# Patient Record
Sex: MALE | Race: WHITE | NOT HISPANIC OR LATINO | Employment: FULL TIME | URBAN - METROPOLITAN AREA
[De-identification: names, ages, dates, MRNs, and addresses within clinical notes are randomized per-mention and may not be internally consistent; named-entity substitution may affect disease eponyms.]

---

## 2018-11-12 ENCOUNTER — OFFICE VISIT (OUTPATIENT)
Dept: INTERNAL MEDICINE | Facility: CLINIC | Age: 38
End: 2018-11-12
Payer: COMMERCIAL

## 2018-11-12 ENCOUNTER — LAB VISIT (OUTPATIENT)
Dept: LAB | Facility: OTHER | Age: 38
End: 2018-11-12
Payer: COMMERCIAL

## 2018-11-12 VITALS
BODY MASS INDEX: 29.92 KG/M2 | SYSTOLIC BLOOD PRESSURE: 124 MMHG | DIASTOLIC BLOOD PRESSURE: 90 MMHG | WEIGHT: 220.88 LBS | HEART RATE: 71 BPM | HEIGHT: 72 IN | OXYGEN SATURATION: 97 %

## 2018-11-12 DIAGNOSIS — H46.9 OPTIC NEURITIS, LEFT: ICD-10-CM

## 2018-11-12 DIAGNOSIS — E03.8 SUBCLINICAL HYPOTHYROIDISM: ICD-10-CM

## 2018-11-12 DIAGNOSIS — E03.9 ACQUIRED HYPOTHYROIDISM: Primary | ICD-10-CM

## 2018-11-12 LAB
T3FREE SERPL-MCNC: 2 PG/ML
T4 FREE SERPL-MCNC: 0.59 NG/DL
TSH SERPL DL<=0.005 MIU/L-ACNC: 88.49 UIU/ML

## 2018-11-12 PROCEDURE — 99203 OFFICE O/P NEW LOW 30 MIN: CPT | Mod: S$GLB,,, | Performed by: FAMILY MEDICINE

## 2018-11-12 PROCEDURE — 99999 PR PBB SHADOW E&M-NEW PATIENT-LVL III: CPT | Mod: PBBFAC,,, | Performed by: FAMILY MEDICINE

## 2018-11-12 PROCEDURE — 3008F BODY MASS INDEX DOCD: CPT | Mod: CPTII,S$GLB,, | Performed by: FAMILY MEDICINE

## 2018-11-12 PROCEDURE — 36415 COLL VENOUS BLD VENIPUNCTURE: CPT

## 2018-11-12 PROCEDURE — 84439 ASSAY OF FREE THYROXINE: CPT

## 2018-11-12 PROCEDURE — 84443 ASSAY THYROID STIM HORMONE: CPT

## 2018-11-12 PROCEDURE — 84481 FREE ASSAY (FT-3): CPT

## 2018-11-12 NOTE — PROGRESS NOTES
Subjective:       Patient ID: Mundo Price is a 37 y.o. male.    Chief Complaint: Establish care     HPI  This patient is new to me.   Mundo Price is a 37 y.o. year old male with recent Hx of vision loss and hypothyroid who presents today to establish care and discuss medical problems.     Beginning 10/29/18 - patient had loss of central vision in the left eye. Went to see optmarcelle (Dr. Cabrera) - exam revealed optic neuritis. Patient was then sent to [facility] ED for further work-up. MRI brain and orbits normal per radiologist.   Took oral prednisone 60mg for only three days. Dr. Cabrera apparently prescribed prednisone for 1 month, but patient discontinued early because he heard from his neurologist friend that prednisone can worsen optic neuritis.   Saw optho again and it was recommended he go see Dr. Camarillo (optho specialist). Thyroid labs ordered and patient found to have hypothyroid (TSH >100, T4 3.4, and T3 73.6).  To treat optic neuritis he was given 3 days of SoluMedrol 1000mg each day.   He finished this course and vision has still not resolved. Still has loss of vision in the center of his left eye, but it has improved.     Lipid panel 5/1/18  Tchol 257  HDL 57        Exercise - hasn't exercised lately due to stress with work   Diet - wife cooks healthy (fish, healthy carbs, veggies, fruits). Only drinks water.     I personally reviewed Past Medical History, Past Surgical History, Social History, and Family History    Review of Systems   Constitutional: Positive for fatigue. Negative for activity change, chills, fever and unexpected weight change.   HENT: Negative for congestion, hearing loss, rhinorrhea, sore throat and trouble swallowing.    Eyes: Positive for visual disturbance. Negative for discharge.   Respiratory: Negative for cough, chest tightness, shortness of breath and wheezing.    Cardiovascular: Negative for chest pain, palpitations and leg swelling.   Gastrointestinal:  Negative for abdominal pain, blood in stool, constipation, diarrhea, nausea and vomiting.   Endocrine: Negative for polydipsia and polyuria.   Genitourinary: Negative for difficulty urinating, dysuria, frequency, hematuria and urgency.   Musculoskeletal: Negative for arthralgias, joint swelling, myalgias and neck pain.   Skin: Negative for rash.   Neurological: Negative for dizziness, syncope, weakness and headaches.   Psychiatric/Behavioral: Negative for confusion, dysphoric mood and sleep disturbance. The patient is not nervous/anxious.        Objective:      Vitals:    11/12/18 1549   BP: (!) 124/90   Pulse: 71   SpO2: 97%   Weight: 100.2 kg (220 lb 14.4 oz)   Height: 6' (1.829 m)     Physical Exam   Constitutional: He is oriented to person, place, and time. He appears well-developed and well-nourished. No distress.   HENT:   Head: Normocephalic and atraumatic.   Right Ear: Hearing, tympanic membrane, external ear and ear canal normal.   Left Ear: Hearing, tympanic membrane, external ear and ear canal normal.   Nose: Nose normal.   Mouth/Throat: Oropharynx is clear and moist and mucous membranes are normal. Normal dentition. No oropharyngeal exudate.   Eyes: Conjunctivae and lids are normal. Pupils are equal, round, and reactive to light.   Neck: Normal range of motion. No thyroid mass and no thyromegaly present.   Cardiovascular: Normal rate, regular rhythm, S1 normal, S2 normal and intact distal pulses.   No murmur heard.  No lower extremity edema   Pulmonary/Chest: Effort normal and breath sounds normal. No respiratory distress.   Abdominal: Soft. Bowel sounds are normal. There is no tenderness.   Lymphadenopathy:     He has no cervical adenopathy.        Right: No supraclavicular adenopathy present.        Left: No supraclavicular adenopathy present.   Neurological: He is alert and oriented to person, place, and time. He is not disoriented.   Skin: Skin is warm and dry. No rash noted.   Psychiatric: He has a  normal mood and affect. His behavior is normal. Thought content normal.   Nursing note and vitals reviewed.      Assessment:       1. Acquired hypothyroidism    2. Optic neuritis, left        Plan:   Diagnoses and all orders for this visit:    Acquired hypothyroidism          - Will repeat lab work to confirm and treat accordingly.   -     TSH; Future  -     T4, free; Future  -     T3, free; Future    Optic neuritis, left        - Patient is s/p IV Solu-medrol therapy. Patient not interested in following up with optho or neurology as he says the vision will improve in a few weeks (per his neurology friend). I recommended that he establish with optho and neurology, but he refused today.     Patient refused vaccines today.

## 2018-11-13 ENCOUNTER — PATIENT MESSAGE (OUTPATIENT)
Dept: INTERNAL MEDICINE | Facility: CLINIC | Age: 38
End: 2018-11-13

## 2018-11-13 DIAGNOSIS — H46.9 OPTIC NEURITIS: Primary | ICD-10-CM

## 2018-11-13 RX ORDER — LEVOTHYROXINE SODIUM 125 UG/1
125 TABLET ORAL DAILY
Qty: 30 TABLET | Refills: 2 | Status: SHIPPED | OUTPATIENT
Start: 2018-11-13 | End: 2018-12-06 | Stop reason: SDUPTHER

## 2018-11-16 PROBLEM — H46.9 OPTIC NEURITIS: Status: ACTIVE | Noted: 2018-11-16

## 2018-11-16 PROBLEM — E03.9 ACQUIRED HYPOTHYROIDISM: Status: ACTIVE | Noted: 2018-11-16

## 2018-11-16 NOTE — TELEPHONE ENCOUNTER
Please process lab order. I sent patient a my chart message about going to have blood drawn.    thanks

## 2018-11-19 ENCOUNTER — LAB VISIT (OUTPATIENT)
Dept: LAB | Facility: OTHER | Age: 38
End: 2018-11-19
Payer: COMMERCIAL

## 2018-11-19 DIAGNOSIS — H46.9 OPTIC NEURITIS: ICD-10-CM

## 2018-11-19 PROCEDURE — 36415 COLL VENOUS BLD VENIPUNCTURE: CPT

## 2018-11-19 PROCEDURE — 86618 LYME DISEASE ANTIBODY: CPT

## 2018-11-21 ENCOUNTER — PATIENT MESSAGE (OUTPATIENT)
Dept: INTERNAL MEDICINE | Facility: CLINIC | Age: 38
End: 2018-11-21

## 2018-11-21 LAB — B BURGDOR AB SER IA-ACNC: 0.03 INDEX VALUE

## 2018-11-28 ENCOUNTER — OFFICE VISIT (OUTPATIENT)
Dept: INTERNAL MEDICINE | Facility: CLINIC | Age: 38
End: 2018-11-28
Attending: INTERNAL MEDICINE
Payer: COMMERCIAL

## 2018-11-28 ENCOUNTER — TELEPHONE (OUTPATIENT)
Dept: INTERNAL MEDICINE | Facility: CLINIC | Age: 38
End: 2018-11-28

## 2018-11-28 ENCOUNTER — HOSPITAL ENCOUNTER (EMERGENCY)
Facility: HOSPITAL | Age: 38
Discharge: HOME | End: 2018-11-28
Attending: EMERGENCY MEDICINE
Payer: COMMERCIAL

## 2018-11-28 VITALS
RESPIRATION RATE: 16 BRPM | OXYGEN SATURATION: 98 % | HEART RATE: 78 BPM | BODY MASS INDEX: 29.12 KG/M2 | WEIGHT: 215 LBS | SYSTOLIC BLOOD PRESSURE: 126 MMHG | DIASTOLIC BLOOD PRESSURE: 68 MMHG | HEIGHT: 72 IN | TEMPERATURE: 99 F

## 2018-11-28 VITALS
BODY MASS INDEX: 28.6 KG/M2 | HEART RATE: 72 BPM | DIASTOLIC BLOOD PRESSURE: 73 MMHG | WEIGHT: 215.81 LBS | HEIGHT: 73 IN | OXYGEN SATURATION: 98 % | SYSTOLIC BLOOD PRESSURE: 115 MMHG

## 2018-11-28 DIAGNOSIS — R20.0 FACIAL NUMBNESS: Primary | ICD-10-CM

## 2018-11-28 DIAGNOSIS — M62.838 MUSCLE SPASM: ICD-10-CM

## 2018-11-28 DIAGNOSIS — G37.9 DEMYELINATING CHANGES IN BRAIN: Primary | ICD-10-CM

## 2018-11-28 DIAGNOSIS — R20.2 PARESTHESIAS: ICD-10-CM

## 2018-11-28 DIAGNOSIS — H46.9 OPTIC NEURITIS: ICD-10-CM

## 2018-11-28 LAB
25(OH)D3+25(OH)D2 SERPL-MCNC: 26 NG/ML
ALBUMIN SERPL BCP-MCNC: 5 G/DL
ALP SERPL-CCNC: 47 U/L
ALT SERPL W/O P-5'-P-CCNC: 60 U/L
ANION GAP SERPL CALC-SCNC: 11 MMOL/L
AST SERPL-CCNC: 33 U/L
BACTERIA #/AREA URNS AUTO: NORMAL /HPF
BASOPHILS # BLD AUTO: 0.11 K/UL
BASOPHILS NFR BLD: 1.5 %
BILIRUB SERPL-MCNC: 1.5 MG/DL
BILIRUB UR QL STRIP: NEGATIVE
BUN SERPL-MCNC: 18 MG/DL
CALCIUM SERPL-MCNC: 10.3 MG/DL
CHLORIDE SERPL-SCNC: 107 MMOL/L
CLARITY UR REFRACT.AUTO: CLEAR
CO2 SERPL-SCNC: 23 MMOL/L
COLOR UR AUTO: YELLOW
CREAT SERPL-MCNC: 0.9 MG/DL
DIFFERENTIAL METHOD: ABNORMAL
EOSINOPHIL # BLD AUTO: 0.1 K/UL
EOSINOPHIL NFR BLD: 1.1 %
ERYTHROCYTE [DISTWIDTH] IN BLOOD BY AUTOMATED COUNT: 11.5 %
EST. GFR  (AFRICAN AMERICAN): >60 ML/MIN/1.73 M^2
EST. GFR  (NON AFRICAN AMERICAN): >60 ML/MIN/1.73 M^2
GLUCOSE SERPL-MCNC: 78 MG/DL
GLUCOSE UR QL STRIP: NEGATIVE
HCT VFR BLD AUTO: 49.8 %
HGB BLD-MCNC: 16.8 G/DL
HGB UR QL STRIP: NEGATIVE
IMM GRANULOCYTES # BLD AUTO: 0.15 K/UL
IMM GRANULOCYTES NFR BLD AUTO: 2.1 %
KETONES UR QL STRIP: NEGATIVE
LEUKOCYTE ESTERASE UR QL STRIP: ABNORMAL
LYMPHOCYTES # BLD AUTO: 2 K/UL
LYMPHOCYTES NFR BLD: 28.1 %
MAGNESIUM SERPL-MCNC: 2.8 MG/DL
MCH RBC QN AUTO: 32.6 PG
MCHC RBC AUTO-ENTMCNC: 33.7 G/DL
MCV RBC AUTO: 97 FL
MICROSCOPIC COMMENT: NORMAL
MONOCYTES # BLD AUTO: 0.7 K/UL
MONOCYTES NFR BLD: 9 %
NEUTROPHILS # BLD AUTO: 4.2 K/UL
NEUTROPHILS NFR BLD: 58.2 %
NITRITE UR QL STRIP: NEGATIVE
NRBC BLD-RTO: 0 /100 WBC
PH UR STRIP: 5 [PH] (ref 5–8)
PLATELET # BLD AUTO: 182 K/UL
PMV BLD AUTO: 10 FL
POTASSIUM SERPL-SCNC: 3.9 MMOL/L
PROT SERPL-MCNC: 8.7 G/DL
PROT UR QL STRIP: NEGATIVE
RBC # BLD AUTO: 5.16 M/UL
RBC #/AREA URNS AUTO: 2 /HPF (ref 0–4)
SODIUM SERPL-SCNC: 141 MMOL/L
SP GR UR STRIP: 1.01 (ref 1–1.03)
URN SPEC COLLECT METH UR: ABNORMAL
WBC # BLD AUTO: 7.26 K/UL
WBC #/AREA URNS AUTO: 5 /HPF (ref 0–5)

## 2018-11-28 PROCEDURE — 83735 ASSAY OF MAGNESIUM: CPT

## 2018-11-28 PROCEDURE — A9585 GADOBUTROL INJECTION: HCPCS | Performed by: EMERGENCY MEDICINE

## 2018-11-28 PROCEDURE — 25500020 PHARM REV CODE 255: Performed by: EMERGENCY MEDICINE

## 2018-11-28 PROCEDURE — 86147 CARDIOLIPIN ANTIBODY EA IG: CPT

## 2018-11-28 PROCEDURE — 99213 OFFICE O/P EST LOW 20 MIN: CPT | Mod: S$GLB,,, | Performed by: INTERNAL MEDICINE

## 2018-11-28 PROCEDURE — 86705 HEP B CORE ANTIBODY IGM: CPT

## 2018-11-28 PROCEDURE — 82164 ANGIOTENSIN I ENZYME TEST: CPT

## 2018-11-28 PROCEDURE — 86038 ANTINUCLEAR ANTIBODIES: CPT

## 2018-11-28 PROCEDURE — 86592 SYPHILIS TEST NON-TREP QUAL: CPT

## 2018-11-28 PROCEDURE — 86235 NUCLEAR ANTIGEN ANTIBODY: CPT

## 2018-11-28 PROCEDURE — 81001 URINALYSIS AUTO W/SCOPE: CPT

## 2018-11-28 PROCEDURE — 85025 COMPLETE CBC W/AUTO DIFF WBC: CPT

## 2018-11-28 PROCEDURE — 99999 PR PBB SHADOW E&M-EST. PATIENT-LVL III: CPT | Mod: PBBFAC,,, | Performed by: INTERNAL MEDICINE

## 2018-11-28 PROCEDURE — 80053 COMPREHEN METABOLIC PANEL: CPT

## 2018-11-28 PROCEDURE — 82306 VITAMIN D 25 HYDROXY: CPT

## 2018-11-28 PROCEDURE — 86703 HIV-1/HIV-2 1 RESULT ANTBDY: CPT

## 2018-11-28 PROCEDURE — 86787 VARICELLA-ZOSTER ANTIBODY: CPT

## 2018-11-28 PROCEDURE — 3008F BODY MASS INDEX DOCD: CPT | Mod: CPTII,S$GLB,, | Performed by: INTERNAL MEDICINE

## 2018-11-28 PROCEDURE — 86255 FLUORESCENT ANTIBODY SCREEN: CPT

## 2018-11-28 PROCEDURE — 86803 HEPATITIS C AB TEST: CPT

## 2018-11-28 PROCEDURE — 86704 HEP B CORE ANTIBODY TOTAL: CPT

## 2018-11-28 PROCEDURE — 86790 VIRUS ANTIBODY NOS: CPT

## 2018-11-28 PROCEDURE — 99285 EMERGENCY DEPT VISIT HI MDM: CPT | Mod: ,,, | Performed by: EMERGENCY MEDICINE

## 2018-11-28 PROCEDURE — 86235 NUCLEAR ANTIGEN ANTIBODY: CPT | Mod: 59

## 2018-11-28 PROCEDURE — 99284 EMERGENCY DEPT VISIT MOD MDM: CPT | Mod: 25

## 2018-11-28 RX ORDER — GADOBUTROL 604.72 MG/ML
10 INJECTION INTRAVENOUS
Status: COMPLETED | OUTPATIENT
Start: 2018-11-28 | End: 2018-11-28

## 2018-11-28 RX ADMIN — GADOBUTROL 10 ML: 604.72 INJECTION INTRAVENOUS at 03:11

## 2018-11-28 NOTE — ED NOTES
Pt is AA&O times four Resp full and reg Radial pulse strong and reg Wife at bedside PT awaiting MRI results

## 2018-11-28 NOTE — TELEPHONE ENCOUNTER
----- Message from Heidy Panda sent at 11/28/2018  8:05 AM CST -----  Contact: pt            Name of Who is Calling: Mundo      What is the request in detail: requesting a call back in reference to getting orders for an Mri  As soon as possible for Optic neuritis. Pt right side of face is having numbness.Please call pt as soon as possible      Can the clinic reply by [facility]: no    What Number to Call Back if not in [facility]: [phone]

## 2018-11-28 NOTE — TELEPHONE ENCOUNTER
Called pt and he stated that he was diagnose with Optic neuritis a few months ago and now the right side of his face have numbness. Schedule pt and appt with Sonia at [facility]

## 2018-11-28 NOTE — ED NOTES
Pt identifiers Mundo Price were checked and are correct  LOC: The patient is awake, alert, aware of environment with an appropriate affect. Oriented x3, speaking appropriately  APPEARANCE: Pt resting comfortably , in no acute distress, pt is clean and well groomed, clothing properly fastened  SKIN: Skin warm, dry and intact, normal skin turgor, moist mucus membranes  RESPIRATORY: Airway is open and patent, respirations are spontaneous, even and unlabored, normal effort and rate Breath sounds clear rosibel to all lung fields on auscultation  CARDIAC: Normal rate and rhythm, no peripheral edema noted, capillary refill < 3 seconds, bilateral radial pulses 2+  ABDOMEN: Soft, nontender, nondistended. Bowel sounds present   NEUROLOGIC: PERRL, facial expression is symmetrical, patient moving all extremities spontaneously, normal sensation in all extremities when touched with a finger.  Follows all commands appropriately States has numbness to left lateral side eye, right ear, right cheek and jaw  MUSCULOSKELETAL: No obvious deformities.

## 2018-11-28 NOTE — ED TRIAGE NOTES
Pt states he was diagnosed with left eye optic neuritis on 10/31/2018  Two nights ago he started having pain in his lower back  Today he awakened with numbness to right ear, right cheek , right jaw, and  to lateral side left eye

## 2018-11-28 NOTE — ED PROVIDER NOTES
Encounter Date: 11/28/2018    SCRIBE #1 NOTE: I, Gómez Cornell, am scribing for, and in the presence of,  Dr. Whittaker. I have scribed the entire note.       History     Chief Complaint   Patient presents with    Numbness     r side of face and numb on L side under ear, my dr sent me here for mri, to r/o MS     38 y.o. man with a recent history of optic neuritis of the left eye (diagnosed 10/31/18) and hypothyroidism presents with CC of facial parasthesias and concern for MS.  Patient states that approx 3 weeks ago, shortly after his diagnosis of optic neuritis, he experienced twitching in his right lateral aspect of the right posterior knee.  He also experienced subsequent left calf weakness that resolved 2 weeks prior. Over the past 3 evenings, pt has had a sore lower back. It resolved with ibuprofen. Pt attributed it possibly due to increased physical exertion as a .  This morning, patient experienced right sided facial parasthesias which includes his right ear with radiation to cheek and jaw. Also occasional paresthesias to left lateral mis-orbital region..  Denies any family history of multiple sclerosis.  States his previous MRI came back negative. He was seen at [facility] and diagnosed with optic neuritis as well as hypothyroidism and has been compliant with synthroid since.  Given the new neuro findings today, patient spoke with his friend, a neurologist with the [employer], who recommended he come to [facility] for MRI and LP.  Denies chills, fever, abdominal pain, EtOH and drug use.  Endorses associated photophobia in his left eye and headaches. Both are worsened by bright lights.      The history is provided by the patient, the spouse and medical records.     Review of patient's allergies indicates:   Allergen Reactions    Ciprofloxacin      Fatigued, GI upset     Past Medical History:   Diagnosis Date    Known health problems: none     Optic neuritis     Thyroid disease      Past Surgical History:    Procedure Laterality Date    CLAVICLE SURGERY Right     has plate/hardware    HERNIA REPAIR Right     inguinal     TONSILLECTOMY       Family History   Problem Relation Age of Onset    Hypothyroidism Father     Hypothyroidism Sister     No Known Problems Mother     Heart attack Maternal Grandfather     Dementia Paternal Grandmother     Cancer Maternal Aunt         unknown type     Social History     Tobacco Use    Smoking status: Former Smoker     Years: 12.00     Types: Cigarettes     Last attempt to quit: 2016     Years since quittin.0    Smokeless tobacco: Never Used    Tobacco comment: would smoke socially   Substance Use Topics    Alcohol use: Yes     Frequency: 2-4 times a month     Drinks per session: 3 or 4     Binge frequency: Less than monthly     Comment: social    Drug use: No     Review of Systems   Constitutional: Negative for chills and fever.   HENT: Negative for sore throat.    Eyes: Positive for photophobia.   Respiratory: Negative for shortness of breath.    Cardiovascular: Negative for chest pain.   Gastrointestinal: Negative for abdominal pain and nausea.   Genitourinary: Negative for dysuria.   Musculoskeletal: Positive for neck pain. Negative for back pain.        Positive for Muscle soreness and weakness   Skin: Negative for rash.   Neurological: Positive for numbness and headaches. Negative for weakness.   Hematological: Does not bruise/bleed easily.       Physical Exam     Initial Vitals [18 1222]   BP Pulse Resp Temp SpO2   127/75 84 18 98 °F (36.7 °C) 100 %      MAP       --         Physical Exam    Nursing note and vitals reviewed.  Constitutional: He appears well-developed and well-nourished. He is not diaphoretic. No distress.   HENT:   Head: Normocephalic and atraumatic.   No mastoid tenderness     Eyes: Conjunctivae and EOM are normal.   Neck: Normal range of motion. Neck supple. Muscular tenderness present. No JVD present.        Cardiovascular: Normal rate, regular rhythm, normal heart sounds and intact distal pulses. Exam reveals no gallop and no friction rub.    No murmur heard.  Pulmonary/Chest: Breath sounds normal. No respiratory distress. He has no wheezes. He has no rhonchi. He has no rales. He exhibits no tenderness.   Abdominal: Soft. Bowel sounds are normal. He exhibits no distension and no mass. There is no tenderness. There is no rebound and no guarding.   Musculoskeletal: Normal range of motion. He exhibits no tenderness.   Lymphadenopathy:     He has no cervical adenopathy.   Neurological: He is alert and oriented to person, place, and time. He has normal strength. A cranial nerve deficit (Subjectively decreased sensation in upper facial nerve distribution bilaterally) and sensory deficit (Right facial sensory deficit.  ) is present.   No pronator drift bilaterally.  FTN normal bilat   Skin: Skin is warm and dry. Capillary refill takes less than 2 seconds.   Psychiatric: He has a normal mood and affect.         ED Course   Procedures  Labs Reviewed   CBC W/ AUTO DIFFERENTIAL - Abnormal; Notable for the following components:       Result Value    MCH 32.6 (*)     Immature Granulocytes 2.1 (*)     Immature Grans (Abs) 0.15 (*)     All other components within normal limits   COMPREHENSIVE METABOLIC PANEL - Abnormal; Notable for the following components:    Total Protein 8.7 (*)     Total Bilirubin 1.5 (*)     Alkaline Phosphatase 47 (*)     ALT 60 (*)     All other components within normal limits    Narrative:     ADD ON HAVG, HIV12, HCVAB, HBCAB PER JAHAIRA VARGHESE MD    11/28/2018  13:53   ADD ON VITAMIN D PER JAHAIRA VARGHESE MD  11/28/2018  13:53    MAGNESIUM - Abnormal; Notable for the following components:    Magnesium 2.8 (*)     All other components within normal limits    Narrative:     ADD ON HAVG, HIV12, HCVAB, HBCAB PER JAHAIRA VARGHESE MD    11/28/2018  13:53   ADD ON VITAMIN D PER JAHAIRA VARGHESE  MD  11/28/2018  13:53    VITAMIN D - Abnormal; Notable for the following components:    Vit D, 25-Hydroxy 26 (*)     All other components within normal limits    Narrative:     ADD ON HAVG, HIV12, HCVAB, HBCAB PER JAHAIRA VARGHESE MD    11/28/2018  13:53   ADD ON VITAMIN D PER JAHAIRA VARGHESE MD  11/28/2018  13:53    SANGEETA PROFILE I (SCREEN)   ANGIOTENSIN CONVERTING ENZYME   ANTIPHOSPHOLIPID AB (ANTICARDIOLIPIN)   HEPATITIS B CORE ANTIBODY, IGM   HEPATITIS B CORE ANTIBODY, TOTAL   HEPATITIS C ANTIBODY   HIV 1 / 2 ANTIBODY   MS PROFILE   LYME DISEASE DNA PROBE, DIRECT   QUANTIFERON GOLD TB   RPR   ANTI -SSA ANTIBODY   ANTI-SSB ANTIBODY   VITAMIN D   VARICELLA ZOSTER ANTIBODY, IGG   MS PROFILE BLOOD COLLECTION   HEPATITIS A ANTIBODY, IGG   NMO AQUAPORIN-4-IGG, SERUM   URINALYSIS, REFLEX TO URINE CULTURE   HEPATITIS A ANTIBODY, IGG   HIV 1 / 2 ANTIBODY   HEPATITIS C ANTIBODY   HEPATITIS B CORE ANTIBODY, TOTAL   VARICELLA ZOSTER ANTIBODY, IGG   ANTIPHOSPHOLIPID AB (ANTICARDIOLIPIN)   SANGEETA PROFILE I (SCREEN)   RPR   ANGIOTENSIN CONVERTING ENZYME   ANTI -SSA ANTIBODY   ANTI-SSB ANTIBODY   HEPATITIS B CORE ANTIBODY, IGM          Imaging Results          MRI Brain W WO Contrast (Final result)  Result time 11/28/18 15:56:22    Final result by Justin Ann DO (11/28/18 15:56:22)                 Impression:      Single subcentimeter T2 FLAIR signal hyperintensity within the mesial temporal white matter bilaterally.  There is are nonspecific and of uncertain clinical significance in light of history may be sequela of prior demyelination.  No evidence for mass effect or corresponding enhancement to suggest active demyelination.    Clinical correlation and follow-up advised      Electronically signed by: Justin Ann DO  Date:    11/28/2018  Time:    15:56             Narrative:    EXAMINATION:  MRI BRAIN W WO CONTRAST    CLINICAL HISTORY:  MS protocol;    TECHNIQUE:  Sagittal 3D space FLAIR which was reformatted in the  coronal and sagittal planes.  Axial T2, axial gradient, axial T1 and axial diffusion imaging of the whole brain without contrast.  Following contrast administration axial T1 and sagittal T1 spoiled gradient which was reformatted in the coronal and axial planes were performed.  Ten ML Gadavist intravenous contrast.    COMPARISON:  None    FINDINGS:  The brain parenchyma is normal in contour.  There are subcentimeter foci of T2 FLAIR signal hyperintensity in the mesial temporal subcortical white matter bilaterally which are nonspecific and may be sequela of prior demyelination in light of history, no corresponding diffusion signal abnormality or enhancement.  There.  The ventricles are stable in size and configuration without evidence for hydrocephalus.  There is no midline shift or mass effect.  No abnormal intra or extra-axial fluid collection.  There is no diffusion restriction to suggest acute infarction.    No abnormal parenchymal susceptibility to suggest parenchymal hemorrhage.  There is no abnormal parenchymal enhancement.  The major intracranial T2 flow voids are present.                               MRI Cervical Spine W WO Cont (Final result)  Result time 11/28/18 16:00:55    Final result by Justin Ann DO (11/28/18 16:00:55)                 Impression:      Unremarkable MRI of the cervical and thoracic cord without cord signal abnormality to suggest edema.  No abnormal intrathecal enhancement.    There is superimposed degenerative change of the cervical spine most pronounced at C6/C7 level with posterior disc osteophyte, uncovertebral joint hypertrophy with mild central canal stenosis with mild moderate right and mild left neural foraminal stenosis.    Cholelithiasis incidentally identified    Please see above for additional details.      Electronically signed by: Justin Ann DO  Date:    11/28/2018  Time:    16:00             Narrative:    EXAMINATION:  MRI CERVICAL SPINE W WO CONTRAST; MRI  THORACIC SPINE W WO CONTRAST    CLINICAL HISTORY:  MS protocol;.  Paresthesia of skin    TECHNIQUE:  Sagittal T1, T2 and STIR and axial T1 and T2 imaging of the cervical and thoracic spine without contrast.  In addition axial T1 and sagittal fat sat T1 postcontrast imaging of the cervical and thoracic spine were performed following 10 mL intravenous infusion of Gadavist..    COMPARISON:  Concomitant imaging of the brain.    FINDINGS:  MRI cervical spine: Cervical sagittal alignment is slightly straightened.  Cervical vertebral body heights and contours within normal limits.  Craniocervical junction within normal limits cerebellar tonsils are appropriately located.    The cervical spinal cord is normal in signal and contour no cord signal abnormality to suggest edema.  No abnormal intrathecal enhancement.    There is bulging disc with uncovertebral joint hypertrophy C5/C6 level with significant central canal stenosis with mild moderate bilateral foraminal stenosis.    At C6/C7 there is a posterior disc osteophyte with uncovertebral joint hypertrophy with mild central canal stenosis with mild moderate right and mild left neural foraminal stenosis.    Thoracic spine: Thoracic sagittal alignment within normal limits.  Thoracic vertebral body heights and contours within normal limits without evidence for acute fracture or subluxation.    Thoracic spinal cord normal in signal and contour tip of the conus approximates T12/L1 disc level.  No abnormal intrathecal enhancement.    There is no significant disc bulge, central canal or neural foraminal stenosis.    Incidental gallstones in the gallbladder                               MRI Thoracic Spine W WO Cont (Final result)  Result time 11/28/18 16:00:55    Final result by Justin Ann DO (11/28/18 16:00:55)                 Impression:      Unremarkable MRI of the cervical and thoracic cord without cord signal abnormality to suggest edema.  No abnormal intrathecal  enhancement.    There is superimposed degenerative change of the cervical spine most pronounced at C6/C7 level with posterior disc osteophyte, uncovertebral joint hypertrophy with mild central canal stenosis with mild moderate right and mild left neural foraminal stenosis.    Cholelithiasis incidentally identified    Please see above for additional details.      Electronically signed by: Justin Ann DO  Date:    11/28/2018  Time:    16:00             Narrative:    EXAMINATION:  MRI CERVICAL SPINE W WO CONTRAST; MRI THORACIC SPINE W WO CONTRAST    CLINICAL HISTORY:  MS protocol;.  Paresthesia of skin    TECHNIQUE:  Sagittal T1, T2 and STIR and axial T1 and T2 imaging of the cervical and thoracic spine without contrast.  In addition axial T1 and sagittal fat sat T1 postcontrast imaging of the cervical and thoracic spine were performed following 10 mL intravenous infusion of Gadavist..    COMPARISON:  Concomitant imaging of the brain.    FINDINGS:  MRI cervical spine: Cervical sagittal alignment is slightly straightened.  Cervical vertebral body heights and contours within normal limits.  Craniocervical junction within normal limits cerebellar tonsils are appropriately located.    The cervical spinal cord is normal in signal and contour no cord signal abnormality to suggest edema.  No abnormal intrathecal enhancement.    There is bulging disc with uncovertebral joint hypertrophy C5/C6 level with significant central canal stenosis with mild moderate bilateral foraminal stenosis.    At C6/C7 there is a posterior disc osteophyte with uncovertebral joint hypertrophy with mild central canal stenosis with mild moderate right and mild left neural foraminal stenosis.    Thoracic spine: Thoracic sagittal alignment within normal limits.  Thoracic vertebral body heights and contours within normal limits without evidence for acute fracture or subluxation.    Thoracic spinal cord normal in signal and contour tip of the conus  approximates T12/L1 disc level.  No abnormal intrathecal enhancement.    There is no significant disc bulge, central canal or neural foraminal stenosis.    Incidental gallstones in the gallbladder                                 Medical Decision Making:   History:   Old Medical Records: I decided to obtain old medical records.  Initial Assessment:   38 y.o. man with history of optic neuritis of the left eye presents with CC of facial parasthesias and concern for MS.  Differential Diagnosis:   My initial differential diagnoses include, but are not limited to:  Parasthesias, electrolyte abnormalities, MS, autoimmune disease.    Clinical Tests:   Lab Tests: Ordered  Radiological Study: Ordered  ED Management:  Will obtain bloodwork.  Neurology consulted.  After discussion with Neurology and patient, they elect pursue outpatient evaluation.  Given no difficulty performing ADLs, we can initate evaluation in the ED with MRI and serum labs.  Pending normal MRI, patient is able to follow up as an outpatient int he MS clinic .  We arranged a clinic appointment for him.  Patient was offered observation but declined.     Reassessment : CBC within normal limits.  CMP with mild elevations in total bilirubin to 1.5, ALT of 60.  Vitamin D is low.  MRIs reviewed as above.  There appears to be no active demyelinization.  Discussed MRIs with Neurology resident on-call who recommends to continue plan.  No indication for steroids at this time.  On re-evaluation, patient reports symptoms minimally improved but still present.  He remains comfortable with plan to follow up as an outpatient.  Provided with copies of MRI and return precautions.  Uncertain whether this is MS or other autoimmune mimic.      Other:   I have discussed this case with another health care provider.            Scribe Attestation:   Scribe #1: I performed the above scribed service and the documentation accurately describes the services I performed. I attest to the  accuracy of the note.    Attending Attestation:           Physician Attestation for Scribe:      Comments: I, Dr. Jonnie Whittaker, personally performed the services described in this documentation. All medical record entries made by the scribe were at my direction and in my presence.  I have reviewed the chart and agree that the record reflects my personal performance and is accurate and complete. Jonnie Whittaker MD.  3:09 PM 11/28/2018                 Clinical Impression:   The primary encounter diagnosis was Demyelinating changes in brain. A diagnosis of Paresthesias was also pertinent to this visit.                             Jonnie Whittaker MD  11/28/18 7450

## 2018-11-28 NOTE — PROGRESS NOTES
"Subjective:   Patient ID: Mundo Price is a 38 y.o. male  Chief complaint:   Chief Complaint   Patient presents with    Numbness     in face        HPI  Pt here for UC appt for right facial numbness   Reports right sided facial numbness x 1 day - located at right ear and lateral aspect of right eye and right forehead  - no facial weakness or asymmetry or slurred speech   - no lid lag or difficulty closing eye lids   No sinus congestion, facial pain or skin lesions    Reports right leg spasms,   Tightness of right upper buttocks and lower back with mm spasms over past 1-2 weeks  - occ weakness in left leg started about 1-2 weeks ago as well    Recently dx with optic neuroitis in left eye 1 month ago after developed vision loss -  Had neg MRI per pt   Seen by ophtho and neuro ophtho and treated with steroids   - Reviewed records and pt reports that vision improved since seen by pcp     Hypothyroidism: started levothyroxine since seen by pcp      He has a friend that his a neurologist and he discussed sx with him today - concerned about MS and rec brain, c and t spine with and without contrast and LP  And rec serum NMO ab test    Today denies:  No slurred speech  No numbness or weakness in other locations at this time other than face as above  No incontinence   No double vision     Does not have neurologist in town   No recent uri or gi infectioms     Review of Systems    Objective:  Vitals:    11/28/18 1052   BP: 115/73   Pulse: 72   SpO2: 98%   Weight: 97.9 kg (215 lb 13.3 oz)   Height: 6' 1" (1.854 m)     Body mass index is 28.48 kg/m².    Physical Exam   Constitutional: He is oriented to person, place, and time. He appears well-developed and well-nourished.   HENT:   Head: Normocephalic and atraumatic.       Right Ear: External ear normal.   Left Ear: External ear normal.   Nose: Nose normal.   Mouth/Throat: Oropharynx is clear and moist. No oropharyngeal exudate.   Eyes: Conjunctivae and EOM are normal. "   Neck: Neck supple.   Cardiovascular: Normal rate, regular rhythm and intact distal pulses.   Pulmonary/Chest: Effort normal and breath sounds normal.   Abdominal: Soft. Bowel sounds are normal.   Lymphadenopathy:     He has no cervical adenopathy.   Neurological: He is alert and oriented to person, place, and time.   PERRLA, EOMI  No nystagmus  No visual field defects  + facial symmetry  Hearing in tact bilaterally  SCM and SS in tact  Gag in tact  Tongue and uvula midline  Tongue strength in tact  Light touch in tact bilateral UE and LE  Gait normal  5/5 strength throughout  +2 reflexes throughout  PRINCESS in tact UE and LE  Neg Romberg  Normal heal to toe      Skin: Skin is warm and dry.   Psychiatric: He has a normal mood and affect. His behavior is normal. Judgment and thought content normal.   Vitals reviewed.    Assessment:  1. Facial numbness    2. Optic neuritis    3. Muscle spasm        Plan:  Mundo was seen today for numbness.    Diagnoses and all orders for this visit:    Facial numbness    Optic neuritis    Muscle spasm  - recent dx with optic neuritis and hypothyroidism   Discussed concern for MS or other acute cause of neurological sx and refer to er for emergent eval and neuro consult   - pt's wife is coming to [facility] now and will accompany him to    -  ER called and notified of pt's arrival     Health Maintenance   Topic Date Due    TETANUS VACCINE  11/15/1998    Influenza Vaccine  08/01/2018    Lipid Panel  05/01/2023

## 2018-11-29 LAB
ACE SERPL-CCNC: 10 U/L
ANA SER QL IF: NORMAL
ANTI-SSA ANTIBODY: 1.17 EU
ANTI-SSA INTERPRETATION: NEGATIVE
ANTI-SSB ANTIBODY: 0.22 EU
ANTI-SSB INTERPRETATION: NEGATIVE
HBV CORE AB SERPL QL IA: NEGATIVE
HBV CORE IGM SERPL QL IA: NEGATIVE
HCV AB SERPL QL IA: NEGATIVE
HEPATITIS A ANTIBODY, IGG: POSITIVE
HIV 1+2 AB+HIV1 P24 AG SERPL QL IA: NEGATIVE
RPR SER QL: NORMAL
VARICELLA INTERPRETATION: POSITIVE
VARICELLA ZOSTER IGG: 4.69 ISR

## 2018-11-30 LAB
CARDIOLIPIN IGG SER IA-ACNC: <9.4 GPL
CARDIOLIPIN IGM SER IA-ACNC: <9.4 MPL

## 2018-12-05 ENCOUNTER — OFFICE VISIT (OUTPATIENT)
Dept: NEUROLOGY | Facility: CLINIC | Age: 38
End: 2018-12-05
Payer: COMMERCIAL

## 2018-12-05 VITALS
SYSTOLIC BLOOD PRESSURE: 123 MMHG | WEIGHT: 215.81 LBS | HEART RATE: 72 BPM | BODY MASS INDEX: 29.23 KG/M2 | HEIGHT: 72 IN | DIASTOLIC BLOOD PRESSURE: 71 MMHG

## 2018-12-05 DIAGNOSIS — Z71.89 COUNSELING REGARDING GOALS OF CARE: ICD-10-CM

## 2018-12-05 DIAGNOSIS — R90.89 ABNORMAL FINDING ON MRI OF BRAIN: ICD-10-CM

## 2018-12-05 DIAGNOSIS — H54.62 UNQUALIFIED VISUAL LOSS, LEFT EYE, NORMAL VISION RIGHT EYE: Primary | ICD-10-CM

## 2018-12-05 DIAGNOSIS — H35.413 LATTICE DEGENERATION OF BOTH RETINAS: ICD-10-CM

## 2018-12-05 PROCEDURE — 99245 OFF/OP CONSLTJ NEW/EST HI 55: CPT | Mod: S$GLB,,, | Performed by: PSYCHIATRY & NEUROLOGY

## 2018-12-05 PROCEDURE — 99999 PR PBB SHADOW E&M-EST. PATIENT-LVL III: CPT | Mod: PBBFAC,,, | Performed by: PSYCHIATRY & NEUROLOGY

## 2018-12-05 NOTE — LETTER
December 7, 2018      Samantha Mendez PA-C  [address]  [facility] [address]           [facility] Multiple S[facility]  [address]  [facility] [address]  Phone: [phone]          Patient: Mundo Price   MR Number: [id number]   YOB: 1980   Date of Visit: 12/5/2018       Dear Samantha Mendez:    Thank you for referring Mundo Price to me for evaluation. Attached you will find relevant portions of my assessment and plan of care.    If you have questions, please do not hesitate to call me. I look forward to following Mundo Price along with you.    Sincerely,    Tanesha Cuellar MD    Enclosure  CC:  No Recipients    If you would like to receive this communication electronically, please contact [email or web address] or [phone] to request more information on [facility] access.    For providers and/or their staff who would like to refer a patient to [facility], please contact us through our one-stop-shop provider referral line, [facility], at [phone].    If you feel you have received this communication in error or would no longer like to receive these types of communications, please e-mail [email or web address]

## 2018-12-05 NOTE — PROGRESS NOTES
"Neurology Consultation    Ms Price is a 39 y/o man who reports that his neurologic symptoms developed in late October when he developed decreased vision in left eye.  Specifically, he developed a thin horizontal "band" of visual graying through middle of his field of vision (left to right), with normal vision above and below this band. No pain with eye movements;    He saw ophthalmologist Dr. Cabrera,  who felt he had ON and possible MS--described left APD and decreased color vision OS in notes. 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   Went to ER at [facility] b/c of possible ON diagnosis and had MRI brain, orbits and C spine were done which were normal (by report).  He was referred to Dr. Camarillo who agreed he may have ON, and treated with 3 days of pulse steroids.  Pt does feel that his vision improved after steroids, but is not sure that this is result of steorids per se b/c did not improve right away;  he continues to have significant intermittent visual problems, especially when out in the bright light (cannot see at all) when its bright outside.  He reports seeing much better when it's dark.      He went to ER last Wednesday b/c of sudden onset of right facial numbness involving mid-face, under eye, cheek and lower face.  These symptoms lasted 4-5 hours and then improved.  Came to ER at [facility] and MRIs were done, labs, and referral placed to me.  MRI shows couple of punctate lesions in temporal white matter bilaterally, pattern nonspecific.      Pt states vision is considerably better compared to the way it was in October, but he still has some blurriness and he still reports mild difficulty with color vision.      He has also been diagnosed with hypothyroidism, and has been started on synthroid.     Pt states that two years ago, he developed some vague numbness of left fingers in left arm; intermittent over a few days and then resolved;     Review of systems:   A complete 15 " system ROS was completed by the patient, reviewed by me and will uploaded into the EHR.     Past Medical History:   Diagnosis Date    Known health problems: none     Optic neuritis     Optic neuritis     Thyroid disease        Past Surgical History:   Procedure Laterality Date    CLAVICLE SURGERY Right     has plate/hardware    HERNIA REPAIR Right     inguinal     TONSILLECTOMY         Family History   Problem Relation Age of Onset    Hypothyroidism Father     Hypothyroidism Sister     No Known Problems Mother     Heart attack Maternal Grandfather     Dementia Paternal Grandmother     Cancer Maternal Aunt         unknown type       Social History     Socioeconomic History    Marital status:      Spouse name: None    Number of children: 0    Years of education: None    Highest education level: None   Social Needs    Financial resource strain: None    Food insecurity - worry: None    Food insecurity - inability: None    Transportation needs - medical: None    Transportation needs - non-medical: None   Occupational History     Comment:  in    Tobacco Use    Smoking status: Former Smoker     Years: 12.00     Types: Cigarettes     Last attempt to quit: 2016     Years since quittin.0    Smokeless tobacco: Never Used    Tobacco comment: would smoke socially   Substance and Sexual Activity    Alcohol use: Yes     Frequency: 2-4 times a month     Drinks per session: 3 or 4     Binge frequency: Less than monthly     Comment: social    Drug use: No    Sexual activity: Yes     Partners: Female     Birth control/protection: None     Comment: wife   Other Topics Concern    None   Social History Narrative    None       MEDS: reviewed    Review of patient's allergies indicates:   Allergen Reactions    Ciprofloxacin      Fatigued, GI upset     IMAGING  MRI brain done last week at [facility] shows tiny area of increase signal in white matter in each temporal lobe;  martha negative;   C and T spine MRI were normal;     Physical Exam    Vitals:    12/05/18 1102   BP: 123/71   Pulse: 72   Weight: 97.9 kg (215 lb 13.3 oz)   Height: 6' (1.829 m)       In general, the patient is well nourished.    No bruits. Optic discs normal bilaterally.    MENTAL STATUS: language is fluent, normal verbal comprehension, short-term and remote memory is intact, attention is normal, patient is alert and oriented x 3, fund of knowlege is appropriate by vocabulary.     CRANIAL NERVE EXAM:  Normal color vision OS and OD; VA 20/25 OS, and 20/20 OD; There is no JUSTIN; Extraocular muscles are intact. Pupils are equal, round, and reactive to light. No facial asymmetry. Facial sensation is intact bilaterally. There is no dysarthria. Uvula is midline, and palate moves symmetrically. Shoulder shrug intact bilaterlly. Tongue protrusion is midline. Hearing is grossly intact. Neck is supple.     MOTOR EXAM: Normal bulk and tone throughout UE and LE bilaterally.   No pronator drift; rapid sequential movements are normal; Strength is  5/5 in all groups in the lower extremities and upper extremities;    REFLEXES: 2+ and symmetric throughout in all four extremeties; toes are down bilaterally    SENSORY EXAM: Normal to vibration all 4 limbs;     COORDINATION: Normal finger-to-nose exam     GAIT: Narrow based and stable        IMAGING (personally reviewed):     Results for orders placed during the hospital encounter of 11/28/18   MRI Brain W WO Contrast    Impression Single subcentimeter T2 FLAIR signal hyperintensity within the mesial temporal white matter bilaterally.  There is are nonspecific and of uncertain clinical significance in light of history may be sequela of prior demyelination.  No evidence for mass effect or corresponding enhancement to suggest active demyelination.    Clinical correlation and follow-up advised      Electronically signed by: Justin Ann DO  Date:    11/28/2018  Time:    15:56     Results  for orders placed during the hospital encounter of 11/28/18   MRI Cervical Spine W WO Cont    Impression Unremarkable MRI of the cervical and thoracic cord without cord signal abnormality to suggest edema.  No abnormal intrathecal enhancement.    There is superimposed degenerative change of the cervical spine most pronounced at C6/C7 level with posterior disc osteophyte, uncovertebral joint hypertrophy with mild central canal stenosis with mild moderate right and mild left neural foraminal stenosis.    Cholelithiasis incidentally identified    Please see above for additional details.      Electronically signed by: Justin Ann DO  Date:    11/28/2018  Time:    16:00     Results for orders placed during the hospital encounter of 11/28/18   MRI Thoracic Spine W WO Cont    Impression Unremarkable MRI of the cervical and thoracic cord without cord signal abnormality to suggest edema.  No abnormal intrathecal enhancement.    There is superimposed degenerative change of the cervical spine most pronounced at C6/C7 level with posterior disc osteophyte, uncovertebral joint hypertrophy with mild central canal stenosis with mild moderate right and mild left neural foraminal stenosis.    Cholelithiasis incidentally identified    Please see above for additional details.      Electronically signed by: Justin Ann DO  Date:    11/28/2018  Time:    16:00       LABS:  Lab Results   Component Value Date    WBC 7.26 11/28/2018    HGB 16.8 11/28/2018    HCT 49.8 11/28/2018    MCV 97 11/28/2018     11/28/2018     CMP  Sodium   Date Value Ref Range Status   11/28/2018 141 136 - 145 mmol/L Final     Potassium   Date Value Ref Range Status   11/28/2018 3.9 3.5 - 5.1 mmol/L Final     Chloride   Date Value Ref Range Status   11/28/2018 107 95 - 110 mmol/L Final     CO2   Date Value Ref Range Status   11/28/2018 23 23 - 29 mmol/L Final     Glucose   Date Value Ref Range Status   11/28/2018 78 70 - 110 mg/dL Final     BUN, Bld   Date  Value Ref Range Status   11/28/2018 18 6 - 20 mg/dL Final     Creatinine   Date Value Ref Range Status   11/28/2018 0.9 0.5 - 1.4 mg/dL Final     Calcium   Date Value Ref Range Status   11/28/2018 10.3 8.7 - 10.5 mg/dL Final     Total Protein   Date Value Ref Range Status   11/28/2018 8.7 (H) 6.0 - 8.4 g/dL Final     Albumin   Date Value Ref Range Status   11/28/2018 5.0 3.5 - 5.2 g/dL Final     Total Bilirubin   Date Value Ref Range Status   11/28/2018 1.5 (H) 0.1 - 1.0 mg/dL Final     Comment:     For infants and newborns, interpretation of results should be based  on gestational age, weight and in agreement with clinical  observations.  Premature Infant recommended reference ranges:  Up to 24 hours.............<8.0 mg/dL  Up to 48 hours............<12.0 mg/dL  3-5 days..................<15.0 mg/dL  6-29 days.................<15.0 mg/dL       Alkaline Phosphatase   Date Value Ref Range Status   11/28/2018 47 (L) 55 - 135 U/L Final     AST   Date Value Ref Range Status   11/28/2018 33 10 - 40 U/L Final     ALT   Date Value Ref Range Status   11/28/2018 60 (H) 10 - 44 U/L Final     Anion Gap   Date Value Ref Range Status   11/28/2018 11 8 - 16 mmol/L Final     eGFR if    Date Value Ref Range Status   11/28/2018 >60.0 >60 mL/min/1.73 m^2 Final     eGFR if non    Date Value Ref Range Status   11/28/2018 >60.0 >60 mL/min/1.73 m^2 Final     Comment:     Calculation used to obtain the estimated glomerular filtration  rate (eGFR) is the CKD-EPI equation.                 ASSESSMENT:        1. Recent visual loss OS     2.  nonspecific white matter abnormality brain MRI                 DISCUSSION:   It is not clear to me that patient actually had optic neuritis as there are some atypical clinical features that he described (thin horizontal band of visual loss, vision continues to be very poor in bright light) and he also has evidence of lattice degeneration and retinal hole as per   Rick's notes.  Will refer to Dr. Calderon for second opinion on this matter, and see pt back soon.  If there is no evidence of optic neuritis, then the very mild nonspecific white matter abnormalities on brain MRI have much less clinical significance.         Unqualified visual loss, left eye, normal vision right eye  -     Ambulatory Referral to Ophthalmology    Lattice degeneration of both retinas  -     Ambulatory Referral to Ophthalmology        Tanesha Cuellar MD, MPH

## 2018-12-06 DIAGNOSIS — E03.9 ACQUIRED HYPOTHYROIDISM: Primary | ICD-10-CM

## 2018-12-06 RX ORDER — LEVOTHYROXINE SODIUM 125 UG/1
125 TABLET ORAL DAILY
Qty: 30 TABLET | Refills: 1 | Status: SHIPPED | OUTPATIENT
Start: 2018-12-06 | End: 2019-01-07

## 2018-12-06 NOTE — TELEPHONE ENCOUNTER
Please call patient and inform that we will need to recheck his thyroid level at 8 weeks after he started taking the levothyroxine. Please help him schedule a time to do this lab (early to mid January)    thanks

## 2018-12-06 NOTE — TELEPHONE ENCOUNTER
Called patient and let him know that his medication was approved and he can pick it up at his preferred pharmacy and I tried to schedule patient lab for mid January pt said he is only available the first week in January he going to do it on 01/04/2019

## 2018-12-12 ENCOUNTER — PATIENT MESSAGE (OUTPATIENT)
Dept: NEUROLOGY | Facility: CLINIC | Age: 38
End: 2018-12-12

## 2018-12-13 LAB — NMO/AQP4 FACS,S: NEGATIVE

## 2018-12-18 ENCOUNTER — INITIAL CONSULT (OUTPATIENT)
Dept: OPHTHALMOLOGY | Facility: CLINIC | Age: 38
End: 2018-12-18
Payer: COMMERCIAL

## 2018-12-18 VITALS — HEART RATE: 73 BPM | SYSTOLIC BLOOD PRESSURE: 118 MMHG | DIASTOLIC BLOOD PRESSURE: 75 MMHG

## 2018-12-18 DIAGNOSIS — H54.62 VISION LOSS OF LEFT EYE: Primary | ICD-10-CM

## 2018-12-18 PROCEDURE — 92235 FLUORESCEIN ANGRPH MLTIFRAME: CPT | Mod: S$GLB,,, | Performed by: OPHTHALMOLOGY

## 2018-12-18 PROCEDURE — 92225 PR SPECIAL EYE EXAM, INITIAL: CPT | Mod: LT,S$GLB,, | Performed by: OPHTHALMOLOGY

## 2018-12-18 PROCEDURE — 92004 COMPRE OPH EXAM NEW PT 1/>: CPT | Mod: S$GLB,,, | Performed by: OPHTHALMOLOGY

## 2018-12-18 PROCEDURE — 92134 CPTRZ OPH DX IMG PST SGM RTA: CPT | Mod: S$GLB,,, | Performed by: OPHTHALMOLOGY

## 2018-12-18 PROCEDURE — 99999 PR PBB SHADOW E&M-EST. PATIENT-LVL III: CPT | Mod: PBBFAC,,, | Performed by: OPHTHALMOLOGY

## 2018-12-18 NOTE — PATIENT INSTRUCTIONS
Fluorescein Angiography     A fluorescein angiogram of the retina   Fluorescein angiography is an eye test. It is done to look at the back of your eye, including:  · The blood vessels in your eye  · The layer of tissue at the back of your eye (the retina)  · The center of your retina (the macula)  · The optic nerve  This test can diagnose diseases found in these areas. It can also diagnose other conditions that affect these areas. To do this test, a dye called fluorescein is shot (injected) into your arm. The dye goes into your bloodstream and up into the blood vessels in your eyes. A special camera is then used to take images (angiograms) of your eyes.  Getting ready for your test  Tell your healthcare provider if you:  · Are pregnant or think you may be pregnant  · Are breastfeeding  · Have a history of severe allergic reactions, including to X-ray dye or other medicines  · Have kidney problems  Tell your provider about any medicines you are taking. You may need to stop taking all or some of these before the test. This includes:  · All prescription medicines  · Over-the-counter medicines such as aspirin or ibuprofen  · Street drugs  · Herbs, vitamins, and other supplements  You should arrange for an adult family member or friend to drive you home after your test. Your vision will be blurry for up to 12 hours.  Follow any other instructions from your healthcare provider.  During your test  · You are given eye drops to enlarge (dilate) your pupils.  · You then sit in front of a special camera. You place your chin on the chin rest and look into the camera.  · Images are taken of your eyes, one eye at a time.  · Fluorescein dye is then injected into your arm. The lights in the room are turned off. You may have mild nausea. You may have a warm feeling in your arm or upper body. Tell your provider if your skin feels itchy or if you are having trouble breathing. If so, you could be having an allergic reaction to the  dye.  · More pictures of your eyes are taken over 15 to 30 minutes. The camera shines a bright light into your eyes. Try to keep your head still and your eyes open.  · When enough images have been taken, the test is over.  After your test  Your vision will be blurry for up to 4 to 12 hours. This is because of your dilated pupils. Your eye will be more sensitive to light for up to 12 hours. You may want to wear sunglasses during this time. Do not drive if your vision is very blurry. You may also find it uncomfortable to read. Your skin may look yellow for a few hours. This is from the dye. Your urine will be bright yellow or orange for 24 to 48 hours after the test.     Risks and possible complications  All procedures have some risks. Possible risks of fluorescein angiography include:  · Upset stomach (nausea) and vomiting  · Leaking dye around the injection site that causes pain and swelling  · Metallic taste in your mouth  · Infection at injection site  · Allergic reaction to the dye  · Dry mouth or too much saliva  · Faster heart rate  · Sweating  · Lower back pain   Date Last Reviewed: 5/30/2015  © 2091-3657 [employer]. [address] [place] [address], [place] [address]. All rights reserved. This information is not intended as a substitute for professional medical care. Always follow your healthcare professional's instructions.

## 2018-12-18 NOTE — LETTER
December 18, 2018      Tanesha Cuellar MD  [address]  [facility] [address]           [facility]  [address]  [facility] [address]  Phone: [phone]  Fax: [phone]          Patient: Mundo Price   MR Number: [id number]   YOB: 1980   Date of Visit: 12/18/2018       Dear Dr. Tanesha Cuellar:    Thank you for referring Mundo Price to me for evaluation. Attached you will find relevant portions of my assessment and plan of care.    If you have questions, please do not hesitate to call me. I look forward to following Mundo Price along with you.    Sincerely,    DEVANG Calderon MD    Enclosure  CC:  No Recipients    If you would like to receive this communication electronically, please contact [email or web address] or [phone] to request more information on [facility] access.    For providers and/or their staff who would like to refer a patient to [facility], please contact us through our one-stop-shop provider referral line, [facility], at [phone].    If you feel you have received this communication in error or would no longer like to receive these types of communications, please e-mail [email or web address]

## 2018-12-18 NOTE — PROGRESS NOTES
HPI     Optic neuritis consult    [facility]- [facility] Bullock     Pt sts he was told he had a hole in the back of his retina and is here today to get second opinion. October 29th noticed drastic decline in va OS & tension headache on left side went in to see [facility] and was referred to [facility] was referred to trent ophthalmologist Dr. Barnard? 3 days 1000 mg of solumedrol &  Was rx'd the wrong steroids of 15 day step down of prednisone which he did not take.  And was miss diagnosed with graves disease and was tested came back hypo-thyroid. Vision started to get better x the last 2-3 weeks. Vision fluctuates early   morning and late at night vision seems very good.   PRK sx in 2003 OU no problems up until this point.  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 (History)        OCT - No thinning of RNFL OU  No ME    FA - No leakage      A/P    1. Transient Vision loss OS  Following a series of valsalva from vomitting ?could play a role  Was treated as presume optic neuritis  By Marky and had 3 days of IV solumedrol  No evidence of Optic neuritis or any ocular stigmata of MS today.    2. Peripheral retinal pigmentation  Minimal, likely not significant vitreous traction  ASx, no laser retinopexy recommended at present  RD precautions.    3. Hypothyroidism  Newly diagnosed    Retina PRN

## 2018-12-20 ENCOUNTER — PATIENT MESSAGE (OUTPATIENT)
Dept: NEUROLOGY | Facility: CLINIC | Age: 38
End: 2018-12-20

## 2019-01-04 ENCOUNTER — PATIENT MESSAGE (OUTPATIENT)
Dept: INTERNAL MEDICINE | Facility: CLINIC | Age: 39
End: 2019-01-04

## 2019-01-04 ENCOUNTER — TELEPHONE (OUTPATIENT)
Dept: INTERNAL MEDICINE | Facility: CLINIC | Age: 39
End: 2019-01-04

## 2019-01-04 ENCOUNTER — LAB VISIT (OUTPATIENT)
Dept: LAB | Facility: OTHER | Age: 39
End: 2019-01-04
Payer: COMMERCIAL

## 2019-01-04 DIAGNOSIS — Z00.00 ANNUAL PHYSICAL EXAM: Primary | ICD-10-CM

## 2019-01-04 DIAGNOSIS — E03.9 ACQUIRED HYPOTHYROIDISM: ICD-10-CM

## 2019-01-04 LAB
T4 FREE SERPL-MCNC: 1.13 NG/DL
TSH SERPL DL<=0.005 MIU/L-ACNC: 11.29 UIU/ML

## 2019-01-04 PROCEDURE — 84443 ASSAY THYROID STIM HORMONE: CPT

## 2019-01-04 PROCEDURE — 84439 ASSAY OF FREE THYROXINE: CPT

## 2019-01-04 NOTE — TELEPHONE ENCOUNTER
Pt came in and wanted to add a lipid and a1c to his lab work I informed pt that he will have to be fasting for his labs and  pt showed verbal understanding.

## 2019-01-07 ENCOUNTER — PATIENT MESSAGE (OUTPATIENT)
Dept: INTERNAL MEDICINE | Facility: CLINIC | Age: 39
End: 2019-01-07

## 2019-01-07 DIAGNOSIS — E03.9 ACQUIRED HYPOTHYROIDISM: Primary | ICD-10-CM

## 2019-01-07 PROBLEM — E78.2 MIXED HYPERLIPIDEMIA: Status: ACTIVE | Noted: 2019-01-07

## 2019-01-07 RX ORDER — LEVOTHYROXINE SODIUM 150 UG/1
150 TABLET ORAL DAILY
Qty: 30 TABLET | Refills: 2 | Status: SHIPPED | OUTPATIENT
Start: 2019-01-07 | End: 2019-04-03 | Stop reason: SDUPTHER

## 2019-01-28 ENCOUNTER — OFFICE VISIT (OUTPATIENT)
Dept: NEUROLOGY | Facility: CLINIC | Age: 39
End: 2019-01-28
Payer: COMMERCIAL

## 2019-01-28 VITALS
HEART RATE: 73 BPM | BODY MASS INDEX: 29.93 KG/M2 | HEIGHT: 72 IN | DIASTOLIC BLOOD PRESSURE: 78 MMHG | WEIGHT: 221 LBS | SYSTOLIC BLOOD PRESSURE: 112 MMHG

## 2019-01-28 DIAGNOSIS — E03.9 HYPOTHYROIDISM, UNSPECIFIED TYPE: ICD-10-CM

## 2019-01-28 DIAGNOSIS — Z71.89 COUNSELING REGARDING GOALS OF CARE: ICD-10-CM

## 2019-01-28 DIAGNOSIS — R90.89 ABNORMAL FINDING ON MRI OF BRAIN: Primary | ICD-10-CM

## 2019-01-28 DIAGNOSIS — H53.10 SUBJECTIVE VISUAL DISTURBANCE: ICD-10-CM

## 2019-01-28 PROCEDURE — 99999 PR PBB SHADOW E&M-EST. PATIENT-LVL III: CPT | Mod: PBBFAC,,, | Performed by: PSYCHIATRY & NEUROLOGY

## 2019-01-28 PROCEDURE — 3008F BODY MASS INDEX DOCD: CPT | Mod: CPTII,S$GLB,, | Performed by: PSYCHIATRY & NEUROLOGY

## 2019-01-28 PROCEDURE — 99999 PR PBB SHADOW E&M-EST. PATIENT-LVL III: ICD-10-PCS | Mod: PBBFAC,,, | Performed by: PSYCHIATRY & NEUROLOGY

## 2019-01-28 PROCEDURE — 3008F PR BODY MASS INDEX (BMI) DOCUMENTED: ICD-10-PCS | Mod: CPTII,S$GLB,, | Performed by: PSYCHIATRY & NEUROLOGY

## 2019-01-28 PROCEDURE — 99214 OFFICE O/P EST MOD 30 MIN: CPT | Mod: S$GLB,,, | Performed by: PSYCHIATRY & NEUROLOGY

## 2019-01-28 PROCEDURE — 99214 PR OFFICE/OUTPT VISIT, EST, LEVL IV, 30-39 MIN: ICD-10-PCS | Mod: S$GLB,,, | Performed by: PSYCHIATRY & NEUROLOGY

## 2019-01-28 RX ORDER — CHOLECALCIFEROL (VITAMIN D3) 25 MCG
3000 TABLET ORAL DAILY
COMMUNITY
End: 2022-02-14

## 2019-01-28 NOTE — PROGRESS NOTES
"Neurology f/u    Interim History  Ms Price follows up today in clinic for possible ON and abnormal MRI brain.   He's seen Dr. Calderon who did not feel there was objective evidence to support ON diagnosis.   Twitches are much better since thyroid numbers have improved;   Intermittent visual symptoms are better as well;       Initial History of Present Illness  Ms Price is a 39 y/o man who reports that his neurologic symptoms developed in late October when he developed decreased vision in left eye.  Specifically, he developed a thin horizontal "band" of visual graying through middle of his field of vision (left to right), with normal vision above and below this band. No pain with eye movements;    He saw ophthalmologist Dr. Cabrera,  who felt he had ON and possible MS--described left APD and decreased color vision OS in notes. 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   Went to ER at [facility] b/c of possible ON diagnosis and had MRI brain, orbits and C spine were done which were normal (by report).  He was referred to Dr. Camarillo who agreed he may have ON, and treated with 3 days of pulse steroids.  Pt does feel that his vision improved after steroids, but is not sure that this is result of steorids per se b/c did not improve right away;  he continues to have significant intermittent visual problems, especially when out in the bright light (cannot see at all) when its bright outside.  He reports seeing much better when it's dark.      He went to ER last Wednesday b/c of sudden onset of right facial numbness involving mid-face, under eye, cheek and lower face.  These symptoms lasted 4-5 hours and then improved.  Came to ER at [facility] and MRIs were done, labs, and referral placed to me.  MRI shows couple of punctate lesions in temporal white matter bilaterally, pattern nonspecific.      Pt states vision is considerably better compared to the way it was in October, but he still has " some blurriness and he still reports mild difficulty with color vision.      He has also been diagnosed with hypothyroidism, and has been started on synthroid.     Pt states that two years ago, he developed some vague numbness of left fingers in left arm; intermittent over a few days and then resolved;                    ASSESSMENT:        1. Recent visual loss OS--no evidence of ON     2.  nonspecific white matter abnormality brain MRI                 DISCUSSION:   I do not think pt had had an episode of clinical optic neuritis, and this has been confirmed by Dr. Calderon.    As such,  the very mild nonspecific white matter abnormalities on brain MRI have much less clinical significance.  Pt's PE was normal.  I will repeat brain MRI in November (at one year) to make sure there are not new lesions forming; if this is stable, would recommend only prn f/u . I suspect many of his other neurologic symptoms (tremor for example) have resolved with treatment of his thyroid;          Abnormal finding on MRI of brain  -     MRI Brain Demyelinating W W/O Contrast; Future; Expected date: 01/28/2019    Hypothyroidism, unspecified type  -     MRI Brain Demyelinating W W/O Contrast; Future; Expected date: 01/28/2019        Tanesha Cuellar MD, MPH    Over 50% of the 25  minute visit was spent counseling the patient about his diagnosis and rationale for stated w/u.

## 2019-02-05 PROBLEM — R90.89 ABNORMAL FINDING ON MRI OF BRAIN: Status: ACTIVE | Noted: 2019-02-05

## 2019-02-05 PROBLEM — H53.10 SUBJECTIVE VISUAL DISTURBANCE: Status: ACTIVE | Noted: 2019-02-05

## 2019-02-05 PROBLEM — H46.9 OPTIC NEURITIS: Status: RESOLVED | Noted: 2018-11-16 | Resolved: 2019-02-05

## 2019-03-06 ENCOUNTER — LAB VISIT (OUTPATIENT)
Dept: LAB | Facility: OTHER | Age: 39
End: 2019-03-06
Attending: FAMILY MEDICINE
Payer: COMMERCIAL

## 2019-03-06 DIAGNOSIS — E03.9 ACQUIRED HYPOTHYROIDISM: ICD-10-CM

## 2019-03-06 LAB — TSH SERPL DL<=0.005 MIU/L-ACNC: 2.03 UIU/ML

## 2019-03-06 PROCEDURE — 84443 ASSAY THYROID STIM HORMONE: CPT

## 2019-03-06 PROCEDURE — 36415 COLL VENOUS BLD VENIPUNCTURE: CPT

## 2019-03-07 ENCOUNTER — PATIENT MESSAGE (OUTPATIENT)
Dept: INTERNAL MEDICINE | Facility: CLINIC | Age: 39
End: 2019-03-07

## 2019-04-03 RX ORDER — LEVOTHYROXINE SODIUM 150 UG/1
150 TABLET ORAL DAILY
Qty: 30 TABLET | Refills: 5 | Status: SHIPPED | OUTPATIENT
Start: 2019-04-03 | End: 2019-09-30 | Stop reason: SDUPTHER

## 2019-04-22 ENCOUNTER — PATIENT MESSAGE (OUTPATIENT)
Dept: INTERNAL MEDICINE | Facility: CLINIC | Age: 39
End: 2019-04-22

## 2019-04-23 NOTE — TELEPHONE ENCOUNTER
Please contact patient and inform that his thyroid was checked in March and was normal. We can recheck it if he would like, but if his symptoms of feeling shaky and trouble with words continues I would recommend he come in for evaluation.    Thanks!

## 2019-06-13 ENCOUNTER — OFFICE VISIT (OUTPATIENT)
Dept: URGENT CARE | Facility: CLINIC | Age: 39
End: 2019-06-13
Payer: COMMERCIAL

## 2019-06-13 VITALS
SYSTOLIC BLOOD PRESSURE: 118 MMHG | BODY MASS INDEX: 27.77 KG/M2 | TEMPERATURE: 98 F | DIASTOLIC BLOOD PRESSURE: 74 MMHG | WEIGHT: 205 LBS | OXYGEN SATURATION: 99 % | HEART RATE: 77 BPM | RESPIRATION RATE: 16 BRPM | HEIGHT: 72 IN

## 2019-06-13 DIAGNOSIS — H60.331 ACUTE SWIMMER'S EAR OF RIGHT SIDE: Primary | ICD-10-CM

## 2019-06-13 PROCEDURE — 3008F BODY MASS INDEX DOCD: CPT | Mod: CPTII,S$GLB,, | Performed by: STUDENT IN AN ORGANIZED HEALTH CARE EDUCATION/TRAINING PROGRAM

## 2019-06-13 PROCEDURE — 3008F PR BODY MASS INDEX (BMI) DOCUMENTED: ICD-10-PCS | Mod: CPTII,S$GLB,, | Performed by: STUDENT IN AN ORGANIZED HEALTH CARE EDUCATION/TRAINING PROGRAM

## 2019-06-13 PROCEDURE — 99214 PR OFFICE/OUTPT VISIT, EST, LEVL IV, 30-39 MIN: ICD-10-PCS | Mod: S$GLB,,, | Performed by: STUDENT IN AN ORGANIZED HEALTH CARE EDUCATION/TRAINING PROGRAM

## 2019-06-13 PROCEDURE — 99214 OFFICE O/P EST MOD 30 MIN: CPT | Mod: S$GLB,,, | Performed by: STUDENT IN AN ORGANIZED HEALTH CARE EDUCATION/TRAINING PROGRAM

## 2019-06-13 RX ORDER — CIPROFLOXACIN AND DEXAMETHASONE 3; 1 MG/ML; MG/ML
4 SUSPENSION/ DROPS AURICULAR (OTIC) 2 TIMES DAILY
Qty: 7.5 ML | Refills: 0 | Status: SHIPPED | OUTPATIENT
Start: 2019-06-13 | End: 2019-06-20

## 2019-06-13 NOTE — PROGRESS NOTES
Subjective:       Patient ID: Mundo Price is a 38 y.o. male.    Vitals:    06/13/19 1007   Resp: 16   Weight: 93 kg (205 lb)   Height: 6' (1.829 m)       Chief Complaint: Otalgia    Pt states right ear pain with diminished hearing for 3 days. Started after swimming in [place], [place].    Otalgia    There is pain in the right ear. This is a new problem. The current episode started in the past 7 days. The problem occurs constantly. The problem has been gradually worsening. There has been no fever. The pain is at a severity of 8/10. The pain is moderate. Associated symptoms include hearing loss. Pertinent negatives include no abdominal pain, coughing, diarrhea, ear discharge, headaches, neck pain, rash, rhinorrhea, sore throat or vomiting. Treatments tried: peroxide. The treatment provided no relief. There is no history of a chronic ear infection.     Review of Systems   Constitution: Negative for chills and fever.   HENT: Positive for ear pain and hearing loss. Negative for ear discharge, rhinorrhea and sore throat.    Eyes: Negative for blurred vision, discharge and redness.   Cardiovascular: Negative for chest pain, leg swelling and palpitations.   Respiratory: Negative for cough and shortness of breath.    Skin: Negative for color change, itching and rash.   Musculoskeletal: Negative for back pain, joint pain, myalgias and neck pain.   Gastrointestinal: Negative for abdominal pain, diarrhea, nausea and vomiting.   Neurological: Negative for disturbances in coordination, headaches and weakness.   Psychiatric/Behavioral: Negative for depression and hallucinations. The patient is not nervous/anxious.    All other systems reviewed and are negative.      Objective:       Vitals:    06/13/19 1007   BP: 118/74   Pulse: 77   Resp: 16   Temp: 98 °F (36.7 °C)   SpO2: 99%   Weight: 93 kg (205 lb)   Height: 6' (1.829 m)     Physical Exam   Constitutional: He is oriented to person, place, and time. He appears well-developed  and well-nourished. No distress.   HENT:   Head: Normocephalic and atraumatic.   Right Ear: There is drainage, swelling and tenderness. Decreased hearing is noted.   Left Ear: Hearing, tympanic membrane, external ear and ear canal normal.   Nose: Nose normal.   Unable to view R TM due to swelling/debris   Eyes: Conjunctivae and EOM are normal. No scleral icterus.   Neck: Normal range of motion. Neck supple.   Cardiovascular: Normal rate, regular rhythm and normal heart sounds.   No murmur heard.  Musculoskeletal: Normal range of motion. He exhibits no edema, tenderness or deformity.   Neurological: He is alert and oriented to person, place, and time. No cranial nerve deficit (grossly intact).   Skin: Skin is warm. No rash noted.   Psychiatric: He has a normal mood and affect. His behavior is normal. Judgment and thought content normal.   Nursing note and vitals reviewed.      Assessment:       1. Acute swimmer's ear of right side        Plan:       Mundo was seen today for otalgia.    Diagnoses and all orders for this visit:    Acute swimmer's ear of right side  -     ciprofloxacin-dexamethasone 0.3-0.1% (CIPRODEX) 0.3-0.1 % DrpS; Place 4 drops into both ears 2 (two) times daily. for 7 days    Diagnosis and medications reviewed with patient, questions answered, and return precautions given    Follow up if symptoms worsen or fail to improve.    Anthony Moreland MD/MPH  [facility]  [facility]

## 2019-06-13 NOTE — PATIENT INSTRUCTIONS

## 2019-06-17 ENCOUNTER — OFFICE VISIT (OUTPATIENT)
Dept: URGENT CARE | Facility: CLINIC | Age: 39
End: 2019-06-17
Payer: COMMERCIAL

## 2019-06-17 VITALS
HEART RATE: 76 BPM | TEMPERATURE: 98 F | BODY MASS INDEX: 27.77 KG/M2 | HEIGHT: 72 IN | OXYGEN SATURATION: 98 % | SYSTOLIC BLOOD PRESSURE: 123 MMHG | RESPIRATION RATE: 20 BRPM | WEIGHT: 205 LBS | DIASTOLIC BLOOD PRESSURE: 76 MMHG

## 2019-06-17 DIAGNOSIS — H60.91 OTITIS EXTERNA OF RIGHT EAR, UNSPECIFIED CHRONICITY, UNSPECIFIED TYPE: ICD-10-CM

## 2019-06-17 DIAGNOSIS — H66.91 RIGHT OTITIS MEDIA, UNSPECIFIED OTITIS MEDIA TYPE: Primary | ICD-10-CM

## 2019-06-17 PROCEDURE — 3008F PR BODY MASS INDEX (BMI) DOCUMENTED: ICD-10-PCS | Mod: CPTII,S$GLB,, | Performed by: FAMILY MEDICINE

## 2019-06-17 PROCEDURE — 99214 OFFICE O/P EST MOD 30 MIN: CPT | Mod: S$GLB,,, | Performed by: FAMILY MEDICINE

## 2019-06-17 PROCEDURE — 99214 PR OFFICE/OUTPT VISIT, EST, LEVL IV, 30-39 MIN: ICD-10-PCS | Mod: S$GLB,,, | Performed by: FAMILY MEDICINE

## 2019-06-17 PROCEDURE — 3008F BODY MASS INDEX DOCD: CPT | Mod: CPTII,S$GLB,, | Performed by: FAMILY MEDICINE

## 2019-06-17 RX ORDER — AMOXICILLIN AND CLAVULANATE POTASSIUM 875; 125 MG/1; MG/1
1 TABLET, FILM COATED ORAL 2 TIMES DAILY
Qty: 20 TABLET | Refills: 0 | Status: SHIPPED | OUTPATIENT
Start: 2019-06-17 | End: 2019-06-17

## 2019-06-17 RX ORDER — AMOXICILLIN AND CLAVULANATE POTASSIUM 875; 125 MG/1; MG/1
1 TABLET, FILM COATED ORAL 2 TIMES DAILY
Qty: 20 TABLET | Refills: 0 | Status: SHIPPED | OUTPATIENT
Start: 2019-06-17 | End: 2019-06-27

## 2019-06-17 NOTE — PROGRESS NOTES
Subjective:       Patient ID: Mundo Price is a 38 y.o. male.    Vitals:  height is 6' (1.829 m) and weight is 93 kg (205 lb). His oral temperature is 97.6 °F (36.4 °C). His blood pressure is 123/76 and his pulse is 76. His respiration is 20 and oxygen saturation is 98%.     Chief Complaint: Ear Fullness    38-year-old male with onset of symptoms about 1.5 week ago, after diving and swimming in a river in [place]. He was first seen 4 days ago with right otitis externa, treated with Ciprodex.  He reports that his ear pain is much improved, but still with the feeling of water in his right ear and not hearing well.  No fever or systemic symptoms          Ear Fullness    There is pain in the right ear. This is a recurrent problem. The current episode started 1 to 4 weeks ago. The problem occurs constantly. The problem has been unchanged. There has been no fever. The pain is at a severity of 1/10. Associated symptoms include hearing loss. Pertinent negatives include no coughing, diarrhea, headaches, rash, sore throat or vomiting. He has tried ear drops for the symptoms. The treatment provided moderate relief.       Constitution: Negative for chills, fatigue and fever.   HENT: Positive for ear pain and hearing loss. Negative for congestion and sore throat.    Neck: Negative for painful lymph nodes.   Cardiovascular: Negative for chest pain and leg swelling.   Eyes: Negative for double vision and blurred vision.   Respiratory: Negative for cough and shortness of breath.    Gastrointestinal: Negative for nausea, vomiting and diarrhea.   Genitourinary: Negative for dysuria, frequency and urgency.   Musculoskeletal: Negative for joint pain, joint swelling, muscle cramps and muscle ache.   Skin: Negative for color change, pale and rash.   Allergic/Immunologic: Negative for seasonal allergies.   Neurological: Negative for dizziness, history of vertigo, light-headedness, passing out and headaches.   Hematologic/Lymphatic:  Negative for swollen lymph nodes, easy bruising/bleeding and history of blood clots. Does not bruise/bleed easily.   Psychiatric/Behavioral: Negative for nervous/anxious, sleep disturbance and depression. The patient is not nervous/anxious.        Objective:      Physical Exam   Constitutional: He is oriented to person, place, and time. He appears well-developed and well-nourished. He is cooperative.  Non-toxic appearance. He does not appear ill. No distress.   HENT:   Head: Normocephalic and atraumatic.   Right Ear: Hearing, tympanic membrane and ear canal normal.   Left Ear: Hearing, tympanic membrane, external ear and ear canal normal.   Nose: Nose normal. No mucosal edema, rhinorrhea or nasal deformity. No epistaxis. Right sinus exhibits no maxillary sinus tenderness and no frontal sinus tenderness. Left sinus exhibits no maxillary sinus tenderness and no frontal sinus tenderness.   Mouth/Throat: Uvula is midline, oropharynx is clear and moist and mucous membranes are normal. No trismus in the jaw. Normal dentition. No uvula swelling. No posterior oropharyngeal erythema.   No pain with movement of right pinna which is much improved from previous visit.  Little discomfort with palpation right tragus, much improved from previous visit.  Right ear canal is still a little irritated and right TM is pink and dull and with serous changes.  Nontender over mastoid process.    Normal left ear canal and TM.  Sinuses nontender   Eyes: Conjunctivae and lids are normal. No scleral icterus.   Sclera clear bilat   Neck: Trachea normal, normal range of motion, full passive range of motion without pain and phonation normal. Neck supple.   Cardiovascular: Normal rate, regular rhythm, normal heart sounds, intact distal pulses and normal pulses.   Pulmonary/Chest: Effort normal and breath sounds normal. No stridor. No respiratory distress. He has no wheezes. He has no rales.   Abdominal: Normal appearance.   Musculoskeletal: Normal  range of motion. He exhibits no edema or deformity.   Lymphadenopathy:     He has no cervical adenopathy.   Neurological: He is alert and oriented to person, place, and time. He exhibits normal muscle tone. Coordination normal.   Skin: Skin is warm, dry and intact. He is not diaphoretic. No pallor.   Psychiatric: He has a normal mood and affect. His speech is normal and behavior is normal. Judgment and thought content normal. Cognition and memory are normal.   Nursing note and vitals reviewed.      Assessment:       1. Right otitis media, unspecified otitis media type    2. Otitis externa of right ear, unspecified chronicity, unspecified type        Plan:         Right otitis media, unspecified otitis media type  -     amoxicillin-clavulanate 875-125mg (AUGMENTIN) 875-125 mg per tablet; Take 1 tablet by mouth 2 (two) times daily. for 10 days  Dispense: 20 tablet; Refill: 0  -     Ambulatory referral to ENT    Otitis externa of right ear, unspecified chronicity, unspecified type  -     Ambulatory referral to ENT    CONTINUE THE ANTIBIOTIC EAR DROPS AS PREVIOUSLY PRESCRIBED.    I SENT A REFERRAL FOR YOU TO SEE ENT.  IF YOU DO NOT HEAR FROM THEM, CALL [phone].    Make sure that you follow up with your primary care doctor in the next 2-5 days if needed .  Return to the Urgent Care if signs or symptoms change and certainly if you have worsening symptoms go to the nearest emergency department for further evaluation.

## 2019-06-17 NOTE — PATIENT INSTRUCTIONS
External Ear Infection (Adult)    External otitis (also called swimmers ear) is an infection in the ear canal. It is often caused by bacteria or fungus. It can occur a few days after water gets trapped in the ear canal (from swimming or bathing). It can also occur after cleaning too deeply in the ear canal with a cotton swab or other object. Sometimes, hair care products get into the ear canal and cause this problem.  Symptoms can include pain, fever, itching, redness, drainage, or swelling of the ear canal. Temporary hearing loss may also occur.  Home care  · Do not try to clean the ear canal. This can push pus and bacteria deeper into the canal.  · Use prescribed ear drops as directed. These help reduce swelling and fight the infection. If an ear wick was placed in the ear canal, apply drops right onto the end of the wick. The wick will draw the medication into the ear canal even if it is swollen closed.  · A cotton ball may be loosely placed in the outer ear to absorb any drainage.  · You may use acetaminophen or ibuprofen to control pain, unless another medication was prescribed. Note: If you have chronic liver or kidney disease or ever had a stomach ulcer or GI bleeding, talk to your health care provider before taking any of these medications.  · Do not allow water to get into your ear when bathing. Also, avoid swimming until the infection has cleared.  Prevention  · Keep your ears dry. This helps lower the risk of infection. Dry your ears with a towel or hair dryer after getting wet. Also, use ear plugs when swimming.  · Do not stick any objects in the ear to remove wax.  · If you feel water trapped in your ear, use ear drops right away. You can get these drops over the counter at most drugstores. They work by removing water from the ear canal.  Follow-up care  Follow up with your health care provider in one week, or as advised.  When to seek medical advice  Call your health care provider right away if  any of these occur:  · Ear pain becomes worse or doesnt improve after 3 days of treatment  · Redness or swelling of the outer ear occurs or gets worse  · Headache  · Painful or stiff neck  · Drowsiness or confusion  · Fever of 100.4ºF (38ºC) or higher, or as directed by your health care provider  · Seizure  Date Last Reviewed: 3/22/2015  © 1424-0830 [employer]. [address] [place] [address], [place] [address]. All rights reserved. This information is not intended as a substitute for professional medical care. Always follow your healthcare professional's instructions.        Understanding Middle Ear Infections in Children    Middle ear infections are most common in children under age 5. Crankiness, a fever, and tugging at or rubbing the ear may all be signs that your child has a middle ear infection. This is especially true if your child has a cold or other viral illness. It's important to call your healthcare provider if you see these or any of the signs listed below.  Call your child's healthcare provider if you notice any signs of a middle ear infection.   What are middle ear infections?  Middle ear infections occur behind the eardrum. The eardrum is the thin sheet of tissue that passes sound waves between the outer and middle ear. These infections are usually caused by bacteria or viruses. These are often related to a recent cold or allergy problem.  A blocked tube  In young children, these bacteria or viruses likely reach the middle ear by traveling the short length of the eustachian tube from the back of the nose. Once in the middle ear, they multiply and spread. This irritates delicate tissues lining the middle ear and eustachian tube. If the tube lining swells enough to block off the tube, air pressure drops in the middle ear. This pulls the eardrum inward, making it stiffer and less able to transmit sound.  Fluid buildup causes pain  Once the eustachian tube swells shut, moisture cant drain from  the middle ear. Fluid that should flush out the infection builds up in the chamber. This may raise pressure behind the eardrum. This can decrease pain slightly. But if the infection spreads to this fluid, pressure behind the eardrum goes way up. The eardrum is forced outward. It becomes painful, and may break.  Chronic fluid affects hearing  If the eardrum doesnt break and the tube remains blocked, the fluid becomes an ongoing (chronic) condition. As the immediate (acute) infection passes, the middle ear fluid thickens. It becomes sticky and takes up less space. Pressure drops in the middle ear once more. Inward suction stiffens the eardrum. This affects hearing. If the fluid is not removed, the eardrum may be stretched and damaged.  Signs of middle ear problems  · A fever over 100.4°F (38.0°C) and cold symptoms  · Severe ear pain  · Any kind of discharge from the ear  · Ear pain that gets worse or doesnt go away after a few days   When to call your child's healthcare provider  Call your child's healthcare provider's office if your otherwise healthy child has any of the signs or symptoms described below:  · Fever (see Fever and children, below)  · Your child has had a seizure caused by the fever  · Rapid breathing or shortness of breath  · A stiff neck or headache  · Trouble swallowing  · Your child acts ill after the fever is gone  · Persistent brown, green, or bloody mucus  · Signs of dehydration. These include severe thirst, dark yellow urine, infrequent urination, dull or sunken eyes, dry skin, and dry or cracked lips.  · Your child still doesn't look or act right to you, even after taking a non-aspirin pain reliever  Fever and children  Always use a digital thermometer to check your childs temperature. Never use a mercury thermometer.  For infants and toddlers, be sure to use a rectal thermometer correctly. A rectal thermometer may accidentally poke a hole in (perforate) the rectum. It may also pass on germs  from the stool. Always follow the product makers directions for proper use. If you dont feel comfortable taking a rectal temperature, use another method. When you talk to your childs healthcare provider, tell him or her which method you used to take your childs temperature.  Here are guidelines for fever temperature. Ear temperatures arent accurate before 6 months of age. Dont take an oral temperature until your child is at least 4 years old.  Infant under 3 months old:  · Ask your childs healthcare provider how you should take the temperature.  · Rectal or forehead (temporal artery) temperature of 100.4°F (38°C) or higher, or as directed by the provider  · Armpit temperature of 99°F (37.2°C) or higher, or as directed by the provider  Child age 3 to 36 months:  · Rectal, forehead (temporal artery), or ear temperature of 102°F (38.9°C) or higher, or as directed by the provider  · Armpit temperature of 101°F (38.3°C) or higher, or as directed by the provider  Child of any age:  · Repeated temperature of 104°F (40°C) or higher, or as directed by the provider  · Fever that lasts more than 24 hours in a child under 2 years old. Or a fever that lasts for 3 days in a child 2 years or older.   Date Last Reviewed: 11/1/2016  © 8986-9347 [employer]. [address] [place] [address], [place] [address]. All rights reserved. This information is not intended as a substitute for professional medical care. Always follow your healthcare professional's instructions.      CONTINUE THE ANTIBIOTIC EAR DROPS AS PREVIOUSLY PRESCRIBED.    I SENT A REFERRAL FOR YOU TO SEE ENT.  IF YOU DO NOT HEAR FROM THEM, CALL [phone].    Make sure that you follow up with your primary care doctor in the next 2-5 days if needed .  Return to the Urgent Care if signs or symptoms change and certainly if you have worsening symptoms go to the nearest emergency department for further evaluation.

## 2019-07-26 ENCOUNTER — LAB VISIT (OUTPATIENT)
Dept: LAB | Facility: OTHER | Age: 39
End: 2019-07-26
Attending: FAMILY MEDICINE
Payer: COMMERCIAL

## 2019-07-26 ENCOUNTER — PATIENT MESSAGE (OUTPATIENT)
Dept: INTERNAL MEDICINE | Facility: CLINIC | Age: 39
End: 2019-07-26

## 2019-07-26 DIAGNOSIS — E03.9 ACQUIRED HYPOTHYROIDISM: ICD-10-CM

## 2019-07-26 DIAGNOSIS — E03.9 ACQUIRED HYPOTHYROIDISM: Primary | ICD-10-CM

## 2019-07-26 LAB — TSH SERPL DL<=0.005 MIU/L-ACNC: 1.15 UIU/ML (ref 0.4–4)

## 2019-07-26 PROCEDURE — 36415 COLL VENOUS BLD VENIPUNCTURE: CPT

## 2019-07-26 PROCEDURE — 84443 ASSAY THYROID STIM HORMONE: CPT

## 2019-07-26 NOTE — TELEPHONE ENCOUNTER
Please inform patient that I put in a thyroid lab order. He can go do that whenever he has time    Thanks!

## 2019-07-29 ENCOUNTER — OFFICE VISIT (OUTPATIENT)
Dept: INTERNAL MEDICINE | Facility: CLINIC | Age: 39
End: 2019-07-29
Payer: COMMERCIAL

## 2019-07-29 ENCOUNTER — TELEPHONE (OUTPATIENT)
Dept: NEUROLOGY | Facility: CLINIC | Age: 39
End: 2019-07-29

## 2019-07-29 ENCOUNTER — HOSPITAL ENCOUNTER (OUTPATIENT)
Dept: RADIOLOGY | Facility: OTHER | Age: 39
Discharge: HOME | End: 2019-07-29
Attending: INTERNAL MEDICINE
Payer: COMMERCIAL

## 2019-07-29 VITALS
OXYGEN SATURATION: 99 % | SYSTOLIC BLOOD PRESSURE: 130 MMHG | DIASTOLIC BLOOD PRESSURE: 84 MMHG | BODY MASS INDEX: 27.23 KG/M2 | HEART RATE: 73 BPM | WEIGHT: 205.5 LBS | HEIGHT: 73 IN

## 2019-07-29 DIAGNOSIS — H54.62 VISION LOSS OF LEFT EYE: ICD-10-CM

## 2019-07-29 DIAGNOSIS — E03.9 ACQUIRED HYPOTHYROIDISM: ICD-10-CM

## 2019-07-29 DIAGNOSIS — M62.838 MUSCLE SPASMS OF LOWER EXTREMITY, UNSPECIFIED LATERALITY: ICD-10-CM

## 2019-07-29 DIAGNOSIS — H54.62 VISION LOSS OF LEFT EYE: Primary | ICD-10-CM

## 2019-07-29 PROCEDURE — 99999 PR PBB SHADOW E&M-EST. PATIENT-LVL III: ICD-10-PCS | Mod: PBBFAC,,, | Performed by: INTERNAL MEDICINE

## 2019-07-29 PROCEDURE — 25500020 PHARM REV CODE 255: Performed by: INTERNAL MEDICINE

## 2019-07-29 PROCEDURE — 3008F PR BODY MASS INDEX (BMI) DOCUMENTED: ICD-10-PCS | Mod: CPTII,S$GLB,, | Performed by: INTERNAL MEDICINE

## 2019-07-29 PROCEDURE — 70553 MRI BRAIN STEM W/O & W/DYE: CPT | Mod: 26,,, | Performed by: RADIOLOGY

## 2019-07-29 PROCEDURE — 70553 MRI BRAIN STEM W/O & W/DYE: CPT | Mod: TC

## 2019-07-29 PROCEDURE — A9585 GADOBUTROL INJECTION: HCPCS | Performed by: INTERNAL MEDICINE

## 2019-07-29 PROCEDURE — 99215 OFFICE O/P EST HI 40 MIN: CPT | Mod: S$GLB,,, | Performed by: INTERNAL MEDICINE

## 2019-07-29 PROCEDURE — 70553 MRI BRAIN DEMYELINATING W/ WO CONTRAST: ICD-10-PCS | Mod: 26,,, | Performed by: RADIOLOGY

## 2019-07-29 PROCEDURE — 99215 PR OFFICE/OUTPT VISIT, EST, LEVL V, 40-54 MIN: ICD-10-PCS | Mod: S$GLB,,, | Performed by: INTERNAL MEDICINE

## 2019-07-29 PROCEDURE — 99999 PR PBB SHADOW E&M-EST. PATIENT-LVL III: CPT | Mod: PBBFAC,,, | Performed by: INTERNAL MEDICINE

## 2019-07-29 PROCEDURE — 3008F BODY MASS INDEX DOCD: CPT | Mod: CPTII,S$GLB,, | Performed by: INTERNAL MEDICINE

## 2019-07-29 RX ORDER — CYCLOBENZAPRINE HCL 5 MG
5 TABLET ORAL 3 TIMES DAILY PRN
Qty: 30 TABLET | Refills: 0 | Status: CANCELLED | OUTPATIENT
Start: 2019-07-29

## 2019-07-29 RX ORDER — GADOBUTROL 604.72 MG/ML
9 INJECTION INTRAVENOUS
Status: COMPLETED | OUTPATIENT
Start: 2019-07-29 | End: 2019-07-29

## 2019-07-29 RX ADMIN — GADOBUTROL 9 ML: 604.72 INJECTION INTRAVENOUS at 01:07

## 2019-07-29 NOTE — TELEPHONE ENCOUNTER
Pt called to schedule an appointment; pt having vision issues and muscle twitches x several months per pt; pt seeing neuro- ophthalmology for vision concerns; pt advised that he should also follow up with his neurologist; pt advised that we would send a message to his provider to review and advise; pt verbalized understanding;

## 2019-07-29 NOTE — TELEPHONE ENCOUNTER
----- Message from [place] sent at 7/29/2019  2:47 PM CDT -----  Needs Advice    Reason for call: Pt is calling to schedule a f/u appt w/ the doctor        Communication Preference: [phone]    Additional Information:

## 2019-07-29 NOTE — PROGRESS NOTES
"Subjective:       Patient ID: Mundo Price is a 38 y.o. male who  has a past medical history of Known health problems: none, Optic neuritis, Optic neuritis, and Thyroid disease.    Chief Complaint: Eye Problem (left eye) and Muscle Pain (all over the body)     History was obtained from the patient and supplemented through chart review   -Reviewed Neurology, Ophthalmology records for left eye vision.  -Discussed patient care with his PCP, Dr. Stroud.    .    HPI    L eye blurry vision, muscle spasms:  Decreased left eye vision since 10/2018.  Then horizontal band of visual graying with the middle of his visual field from left to right.  Otherwise normal vision.  No pain with EOM.  Optho, Dr. Mckeon 12/2018 did not feel that there was enough evidence to support ON.  Was treated with pulse steroids.  Then saw Neurology  for possible ON. H/o OSH MRI in the past not c/w MS.  Continues to have visual problems when out in the bright light.  MRI brain with nonspecific white matter abnormalities. MRI spine without lesions.  Neuro planned to repeat MRI brain in  to assess for new lesions. Suspected that neurologic symptoms might be related to hypothyroidism.  TSH wnl after Synthroid increased.    Has occasional, transient twitching that is chronic since starting Synthroid in 11/2018.  Twitching present in L rib, L arm.  Resolved in R lateral popliteal fossa, R ankle.  No muscle aches, weakness.  1 episode where his LLE from knee on down felt cool after working out.  No paresthesias.  Not bothersome or painful, so elects not to be on a muscle relaxant.  Also with L tongue and L lower lip with occasional "zaps" with pointpoint pain, no fasiculations.  Denies illicit drug use, specifically cocaine.    Has chronic left eye visual disturbance, but was concerned due to blurry vision when bringing objects close to his left eye.  Has difficulty focusing.  No N/V, has mild photophobia.  " Slight L BARRIGA with driving.  Denies fever, recent illness.    No penile discharge, dysuria, genital ulcers.  Monogamous with wife of 2 years.  He declined STD testing.      His best friend is a neurologist [facility] and requested labs, MRI, possible EMG  Lab Results   Component Value Date    HGBA1C 5.0 01/04/2019     No results found for: [id number]    Hypothyroidism:  The patient is taking Synthroid 150.  TFTs normalized after dose adjustment.  Lab Results   Component Value Date    TSH 1.146 07/26/2019    FREET4 1.13 01/04/2019       Review of Systems   Constitutional: Negative for fever and unexpected weight change.   HENT: Negative for rhinorrhea and sneezing.    Eyes: Negative for redness and itching.   Respiratory: Negative for shortness of breath and wheezing.    Cardiovascular: Negative for chest pain and palpitations.   Gastrointestinal: Negative for nausea and vomiting.   Genitourinary: Negative for discharge, dysuria and genital sores.   Musculoskeletal: Negative for gait problem and joint swelling.   Skin: Negative for color change and rash.   Neurological: Positive for tremors. Negative for weakness and numbness.   Hematological: Negative for adenopathy.   Psychiatric/Behavioral: Negative for confusion. The patient is not nervous/anxious.          Past Medical History:   Diagnosis Date    Known health problems: none     Optic neuritis     Optic neuritis     Thyroid disease      Past Surgical History:   Procedure Laterality Date    CLAVICLE SURGERY Right 2014    has plate/hardware    HERNIA REPAIR Right 1992    inguinal     REFRACTIVE SURGERY Bilateral     PRK 2003 OU     TONSILLECTOMY       Family History   Problem Relation Age of Onset    Hypothyroidism Father     Hypothyroidism Sister     No Known Problems Mother     Heart attack Maternal Grandfather     Dementia Paternal Grandmother     Cancer Maternal Aunt         unknown type     Social History     Socioeconomic History    Marital  "status:      Spouse name: Not on file    Number of children: 0    Years of education: Not on file    Highest education level: Not on file   Occupational History     Comment:  in    Social Needs    Financial resource strain: Not on file    Food insecurity:     Worry: Not on file     Inability: Not on file    Transportation needs:     Medical: Not on file     Non-medical: Not on file   Tobacco Use    Smoking status: Former Smoker     Years: 12.00     Types: Cigarettes     Last attempt to quit: 2016     Years since quittin.7    Smokeless tobacco: Never Used    Tobacco comment: would smoke socially   Substance and Sexual Activity    Alcohol use: Yes     Frequency: 2-4 times a month     Drinks per session: 3 or 4     Binge frequency: Less than monthly     Comment: social    Drug use: No    Sexual activity: Yes     Partners: Female     Birth control/protection: None     Comment: wife   Lifestyle    Physical activity:     Days per week: Not on file     Minutes per session: Not on file    Stress: Not on file   Relationships    Social connections:     Talks on phone: Not on file     Gets together: Not on file     Attends Alevism service: Not on file     Active member of club or organization: Not on file     Attends meetings of clubs or organizations: Not on file     Relationship status: Not on file   Other Topics Concern    Not on file   Social History Narrative    Not on file     Objective:      Vitals:    19 0957   BP: 130/84   Pulse: 73   SpO2: 99%   Weight: 93.2 kg (205 lb 7.5 oz)   Height: 6' 1" (1.854 m)      Physical Exam   Constitutional: He appears well-developed and well-nourished. No distress.   HENT:   Head: Normocephalic.   Mouth/Throat: Oropharynx is clear and moist. No oropharyngeal exudate.   Eyes: Pupils are equal, round, and reactive to light. EOM are normal. Right eye exhibits no discharge. Left eye exhibits no discharge. No scleral icterus. "   Neck: Neck supple.   Cardiovascular: Normal rate, regular rhythm and normal heart sounds.   No murmur heard.  Pulmonary/Chest: Effort normal and breath sounds normal. No respiratory distress. He has no wheezes.   Abdominal: Soft. Bowel sounds are normal. He exhibits no distension. There is no tenderness.   Musculoskeletal: He exhibits no edema or deformity.   Lymphadenopathy:     He has no cervical adenopathy.   Neurological: He is alert. He has normal strength. No cranial nerve deficit or sensory deficit. Coordination and gait normal.   VFFTC. Normal FTN.  No dysdiadochokinesia.  Normal proprioception, graphesthesia.   Skin: Skin is warm and dry. Capillary refill takes less than 2 seconds. He is not diaphoretic.   Psychiatric: He has a normal mood and affect. His behavior is normal.         Lab Results   Component Value Date    WBC 7.26 11/28/2018    HGB 16.8 11/28/2018    HCT 49.8 11/28/2018     11/28/2018    CHOL 243 (H) 01/04/2019    TRIG 162 (H) 01/04/2019    HDL 47 01/04/2019    ALT 60 (H) 11/28/2018    AST 33 11/28/2018     11/28/2018    K 3.9 11/28/2018     11/28/2018    CREATININE 0.9 11/28/2018    BUN 18 11/28/2018    CO2 23 11/28/2018    TSH 1.146 07/26/2019    HGBA1C 5.0 01/04/2019       The ASCVD Risk score (Sammie JONATHAN Jr., et al., 2013) failed to calculate for the following reasons:    The 2013 ASCVD risk score is only valid for ages 40 to 79    (Imaging have been independently reviewed)  MRI spine without lesions.    Assessment:       1. Vision loss of left eye    2. Muscle spasms of lower extremity, unspecified laterality    3. Acquired hypothyroidism          Plan:       Mundo was seen today for eye problem and muscle pain.    Diagnoses and all orders for this visit:    Vision loss of left eye  Comments:  Declined STD testing. Possibly occular sx from thyroid; check TFT panel. Check CK, aldolase, but low suspicion. B12 panel. Resched Optho,Neuro, MRI  Orders:  -     T4, free;  Future  -     T3, free; Future  -     T3, REVERSE; Future  -     MRI Brain Demyelinating W W/O Contrast; Future    Muscle spasms of lower extremity, unspecified laterality  Comments:  As above.  Considered muscle relaxant, but symptoms are transient and he declined additional medicines.  Orders:  -     Vitamin B12; Future  -     T4, free; Future  -     CK; Future  -     Aldolase; Future  -     Methylmalonic acid, serum; Future  -     T3, free; Future  -     T3, REVERSE; Future  -     Homocysteine, serum; Future  -     MRI Brain Demyelinating W W/O Contrast; Future    Acquired hypothyroidism  Comments:  Continue Synthroid 150. Recent TSH WNL. Check full thyroid panel. If abnml, consider adding Cytomel with repeat TFTs in 6 wks. If still abnl, refer to Endo.  Orders:  -     T4, free; Future  -     T3, free; Future  -     T3, REVERSE; Future    Other orders  -     Cancel: RPR; Future  -     Cancel: cyclobenzaprine (FLEXERIL) 5 MG tablet; Take 1 tablet (5 mg total) by mouth 3 (three) times daily as needed for Muscle spasms.         Side effects of medication(s) were discussed in detail and patient voiced understanding.  Patient will call back for any issues or complications.     RTC PRN.

## 2019-07-30 ENCOUNTER — OFFICE VISIT (OUTPATIENT)
Dept: OPHTHALMOLOGY | Facility: CLINIC | Age: 39
End: 2019-07-30
Payer: COMMERCIAL

## 2019-07-30 DIAGNOSIS — H54.62 VISION LOSS OF LEFT EYE: Primary | ICD-10-CM

## 2019-07-30 PROCEDURE — 92014 COMPRE OPH EXAM EST PT 1/>: CPT | Mod: S$GLB,,, | Performed by: OPHTHALMOLOGY

## 2019-07-30 PROCEDURE — 99999 PR PBB SHADOW E&M-EST. PATIENT-LVL III: CPT | Mod: PBBFAC,,, | Performed by: OPHTHALMOLOGY

## 2019-07-30 PROCEDURE — 99999 PR PBB SHADOW E&M-EST. PATIENT-LVL III: ICD-10-PCS | Mod: PBBFAC,,, | Performed by: OPHTHALMOLOGY

## 2019-07-30 PROCEDURE — 92014 PR EYE EXAM, EST PATIENT,COMPREHESV: ICD-10-PCS | Mod: S$GLB,,, | Performed by: OPHTHALMOLOGY

## 2019-07-30 NOTE — PROGRESS NOTES
HPI     Triage pt  Hx of OS loss of central vision(fussy) in 10/2018.  Pt had test done for Graves which started synthroid. Notice occasional   twitching different parts of his body.  Patient states experiencing some blurry spots in vision x few days ago.  Hx of PRK OU.  Occasional OU scratchy feeling.     Eye drops:Visine 2-3x a few days ago. OS.     I have personally interviewed the patient, reviewed the history and   examined the patient and agree with the technician's exam.    He noted trouble seeing up and to his left; improved but still there.   Review of imaging studies showed normal extraocular muscle size.    Last edited by Chencho Luna MD on 7/30/2019 10:23 AM. (History)            Assessment /Plan     For exam results, see Encounter Report.    Vision loss of left eye      I found no changes in ocular findings compare to Dr. Flynn in 2018. He seems to be improving. Return as needed.

## 2019-08-15 ENCOUNTER — PATIENT MESSAGE (OUTPATIENT)
Dept: INTERNAL MEDICINE | Facility: CLINIC | Age: 39
End: 2019-08-15

## 2019-08-15 DIAGNOSIS — E03.9 ACQUIRED HYPOTHYROIDISM: Primary | ICD-10-CM

## 2019-08-29 ENCOUNTER — OFFICE VISIT (OUTPATIENT)
Dept: ENDOCRINOLOGY | Facility: CLINIC | Age: 39
End: 2019-08-29
Payer: COMMERCIAL

## 2019-08-29 VITALS
HEART RATE: 74 BPM | DIASTOLIC BLOOD PRESSURE: 78 MMHG | BODY MASS INDEX: 26.99 KG/M2 | HEIGHT: 73 IN | SYSTOLIC BLOOD PRESSURE: 120 MMHG | WEIGHT: 203.69 LBS

## 2019-08-29 DIAGNOSIS — H54.62 VISION LOSS OF LEFT EYE: ICD-10-CM

## 2019-08-29 DIAGNOSIS — E03.9 ACQUIRED HYPOTHYROIDISM: ICD-10-CM

## 2019-08-29 DIAGNOSIS — M62.838 MUSCLE SPASMS OF LOWER EXTREMITY, UNSPECIFIED LATERALITY: Primary | ICD-10-CM

## 2019-08-29 PROCEDURE — 99999 PR PBB SHADOW E&M-EST. PATIENT-LVL III: CPT | Mod: PBBFAC,,, | Performed by: INTERNAL MEDICINE

## 2019-08-29 PROCEDURE — 99203 PR OFFICE/OUTPT VISIT, NEW, LEVL III, 30-44 MIN: ICD-10-PCS | Mod: S$GLB,,, | Performed by: INTERNAL MEDICINE

## 2019-08-29 PROCEDURE — 99999 PR PBB SHADOW E&M-EST. PATIENT-LVL III: ICD-10-PCS | Mod: PBBFAC,,, | Performed by: INTERNAL MEDICINE

## 2019-08-29 PROCEDURE — 3008F BODY MASS INDEX DOCD: CPT | Mod: CPTII,S$GLB,, | Performed by: INTERNAL MEDICINE

## 2019-08-29 PROCEDURE — 99203 OFFICE O/P NEW LOW 30 MIN: CPT | Mod: S$GLB,,, | Performed by: INTERNAL MEDICINE

## 2019-08-29 PROCEDURE — 3008F PR BODY MASS INDEX (BMI) DOCUMENTED: ICD-10-PCS | Mod: CPTII,S$GLB,, | Performed by: INTERNAL MEDICINE

## 2019-08-29 NOTE — ASSESSMENT & PLAN NOTE
I don't know of any correlation between levothyroxine treatment and muscle twitching. My inclination is that these are related to the other neurological symptoms he's been having. I would not recommend switching to an alternative thyroid hormone at this time.

## 2019-08-29 NOTE — ASSESSMENT & PLAN NOTE
Given family history, Hashimoto's is the most likely etiology. TPO antibodies could be checked, although it would not alter management.    He is clinically and biochemically euthyroid on the current levothyroxine dose. Recommend monitoring TSH/FT4 in 6 months, then yearly.    Unclear significance of elevated RT3, so I would not recommend changing to alternative thyroid hormone replacement or additional workup based on that.

## 2019-08-29 NOTE — LETTER
August 29, 2019      Brandi Stroud MD  [address]  [address]  [facility] [address]           [facility] - Endocrinology 6th [place]  [address]  [facility] [address]  Phone: [phone]          Patient: Mundo Price   MR Number: [id number]   YOB: 1980   Date of Visit: 8/29/2019       Dear Dr. Brandi Stroud:    Thank you for referring Mundo Price to me for evaluation. Attached you will find relevant portions of my assessment and plan of care.    If you have questions, please do not hesitate to call me. I look forward to following Mundo Price along with you.    Sincerely,    Oscar Miller MD    Enclosure  CC:  No Recipients    If you would like to receive this communication electronically, please contact [email or web address] or [phone] to request more information on [facility] access.    For providers and/or their staff who would like to refer a patient to [facility], please contact us through our one-stop-shop provider referral line, [facility], at [phone].    If you feel you have received this communication in error or would no longer like to receive these types of communications, please e-mail [email or web address]

## 2019-08-29 NOTE — ASSESSMENT & PLAN NOTE
Seen by several ophthalmologists, neurologists and has had 3 MRIs of the brain, including one of the orbits. No findings to suggest Graves' disease of the orbits (extraocular muscles were symmetric and normal in appearance on MRI).

## 2019-08-29 NOTE — PROGRESS NOTES
Subjective:      Chief Complaint: Hypothyroidism and muscle twitching    HPI: Mundo Price is a 38 y.o. male who is here for an initial evaluation for hypothyroidism.    New to endocrine clinic and to me.    With regards to hypothyroidism:  Initially diagnosed in 11/2018 when he was being evaluated for left eye vision loss. He had a battery of tests, including TSH, which was >100 (at [facility]). The rest of the workup was negative, and he was treated with high dose steroids for optic neuritis, with improvement in his vision. However, a few weeks later he noticed numbness and tingling in the right side of his face, which eventually resolved. He's seen several neurologists and ophthalmologists, and the cause of the symptoms is still unclear.    He was also started on levothyroxine, and his dose has been titrated to 150 mcg daily with normalization of TSH. Since starting the synthroid in November, he's noticed random muscle twitches, sometimes in the legs, and sometimes in the arms. They occur 2-3 times/day and last a few seconds each. Severity is mild. He is more worried about there being an underlying cause for the twitches, and is wondering if they can be a side-effect of levothyroxine or residual symptoms from underactive thyroid. His friend who is a doctor, suggested he have a reverse T3 checked, which came back elevated. He was referred for evaluation to see if his dose needs adjustment or if another formulation of thyroid hormone would be better.    Currentmedication:  Generic levothyroxine 150 mcg daily.    Takes thyroid medication properly without food first thing in the morning     Current symptoms:   See above.  He did not have any of the classic symptoms associated with hypothyroidism, other than being a little overweight (lost 17 lbs since starting levothyroxine). Denies cold intolerance, depression, constipation, fatigue.    Family history significant for multiple family members with thyroid problems:  Dad, aunt, and sister.    Reviewed past medical, family, social history and updated as appropriate.    Review of Systems   Constitutional: Negative for fatigue and unexpected weight change.   Eyes: Positive for visual disturbance (improved after pulse steroids. Still with occasional blurry vision.).   Respiratory: Negative for shortness of breath.    Cardiovascular: Negative for chest pain.   Gastrointestinal: Negative for abdominal pain.   Genitourinary: Negative for urgency.   Musculoskeletal: Negative for arthralgias.   Skin: Negative for wound.   Neurological: Negative for weakness, light-headedness, numbness and headaches.        +muscle twitches   Hematological: Does not bruise/bleed easily.   Psychiatric/Behavioral: Negative for sleep disturbance.     Objective:     Vitals:    08/29/19 0753   BP: 120/78   Pulse: 74     BP Readings from Last 5 Encounters:   08/29/19 120/78   07/29/19 130/84   06/17/19 123/76   06/13/19 118/74   01/28/19 112/78         Physical Exam   Constitutional: He is oriented to person, place, and time. He appears well-developed.   HENT:   Right Ear: External ear normal.   Left Ear: External ear normal.   Nose: Nose normal.   Hearing grossly normal  Dentition grossly normal   Eyes: Conjunctivae are normal. Right eye exhibits no discharge. Left eye exhibits no discharge.   Normal external appearance of eyes. No exophthalmos noted. No conjunctival erythema or excess tearing   Neck: No tracheal deviation present. No thyromegaly (No goiter or nodularity) present.   Cardiovascular: Normal rate.   No murmur heard.  Pulmonary/Chest: Effort normal and breath sounds normal.   Abdominal: Soft. He exhibits no mass. There is no tenderness.   Musculoskeletal: He exhibits no edema.   No digital clubbing or extremity cyanosis   Neurological: He is alert and oriented to person, place, and time. Coordination normal.   Skin: No rash noted.   No subcutaneous nodules noted.   Psychiatric: He has a normal  mood and affect. His behavior is normal.   Alert and oriented to person, place, and situation.   Nursing note and vitals reviewed.      Wt Readings from Last 10 Encounters:   08/29/19 0753 92.4 kg (203 lb 11.3 oz)   07/29/19 0957 93.2 kg (205 lb 7.5 oz)   06/17/19 1508 93 kg (205 lb)   06/13/19 1007 93 kg (205 lb)   01/28/19 1544 100.3 kg (221 lb 0.2 oz)   12/05/18 1102 97.9 kg (215 lb 13.3 oz)   11/28/18 1222 97.5 kg (215 lb)   11/28/18 1052 97.9 kg (215 lb 13.3 oz)   11/12/18 1549 100.2 kg (220 lb 14.4 oz)       Lab Results   Component Value Date    HGBA1C 5.0 01/04/2019     Lab Results   Component Value Date    CHOL 243 (H) 01/04/2019    HDL 47 01/04/2019    LDLCALC 163.6 (H) 01/04/2019    TRIG 162 (H) 01/04/2019    CHOLHDL 19.3 (L) 01/04/2019     Lab Results   Component Value Date     11/28/2018    K 3.9 11/28/2018     11/28/2018    CO2 23 11/28/2018    GLU 78 11/28/2018    BUN 18 11/28/2018    CREATININE 0.9 11/28/2018    CALCIUM 10.3 11/28/2018    PROT 8.7 (H) 11/28/2018    ALBUMIN 5.0 11/28/2018    BILITOT 1.5 (H) 11/28/2018    ALKPHOS 47 (L) 11/28/2018    AST 33 11/28/2018    ALT 60 (H) 11/28/2018    ANIONGAP 11 11/28/2018    ESTGFRAFRICA >60.0 11/28/2018    EGFRNONAA >60.0 11/28/2018    TSH 1.146 07/26/2019      No results found for: MICALBCREAT    Assessment/Plan:     Muscle spasms of lower extremity  I don't know of any correlation between levothyroxine treatment and muscle twitching. My inclination is that these are related to the other neurological symptoms he's been having. I would not recommend switching to an alternative thyroid hormone at this time.    Vision loss of left eye  Seen by several ophthalmologists, neurologists and has had 3 MRIs of the brain, including one of the orbits. No findings to suggest Graves' disease of the orbits (extraocular muscles were symmetric and normal in appearance on MRI).    Acquired hypothyroidism  Given family history, Hashimoto's is the most likely  etiology. TPO antibodies could be checked, although it would not alter management.    He is clinically and biochemically euthyroid on the current levothyroxine dose. Recommend monitoring TSH/FT4 in 6 months, then yearly.    Unclear significance of elevated RT3, so I would not recommend changing to alternative thyroid hormone replacement or additional workup based on that.    Okay to follow-up with PCP for management of hypothyroidism. Refer back to us as needed.

## 2019-09-23 ENCOUNTER — PATIENT MESSAGE (OUTPATIENT)
Dept: INTERNAL MEDICINE | Facility: CLINIC | Age: 39
End: 2019-09-23

## 2019-09-24 NOTE — TELEPHONE ENCOUNTER
Please inform patient that I recommend he come in to see me to discuss those concerns. Or he needs to follow-up with his neurologist.    Thanks!

## 2019-09-30 RX ORDER — LEVOTHYROXINE SODIUM 150 UG/1
150 TABLET ORAL DAILY
Qty: 30 TABLET | Refills: 5 | Status: SHIPPED | OUTPATIENT
Start: 2019-09-30 | End: 2019-10-01

## 2019-10-01 ENCOUNTER — OFFICE VISIT (OUTPATIENT)
Dept: OTOLARYNGOLOGY | Facility: CLINIC | Age: 39
End: 2019-10-01
Payer: COMMERCIAL

## 2019-10-01 ENCOUNTER — OFFICE VISIT (OUTPATIENT)
Dept: INTERNAL MEDICINE | Facility: CLINIC | Age: 39
End: 2019-10-01
Payer: COMMERCIAL

## 2019-10-01 VITALS
WEIGHT: 201.5 LBS | OXYGEN SATURATION: 98 % | BODY MASS INDEX: 26.71 KG/M2 | HEART RATE: 67 BPM | SYSTOLIC BLOOD PRESSURE: 116 MMHG | DIASTOLIC BLOOD PRESSURE: 76 MMHG | HEIGHT: 73 IN

## 2019-10-01 VITALS — BODY MASS INDEX: 26.52 KG/M2 | WEIGHT: 201 LBS

## 2019-10-01 DIAGNOSIS — R13.10 PAIN WITH SWALLOWING: Primary | ICD-10-CM

## 2019-10-01 DIAGNOSIS — E03.9 ACQUIRED HYPOTHYROIDISM: ICD-10-CM

## 2019-10-01 DIAGNOSIS — R25.3 MUSCLE TWITCHING: ICD-10-CM

## 2019-10-01 DIAGNOSIS — R13.10 ODYNOPHAGIA: ICD-10-CM

## 2019-10-01 PROCEDURE — 3008F BODY MASS INDEX DOCD: CPT | Mod: CPTII,S$GLB,, | Performed by: NURSE PRACTITIONER

## 2019-10-01 PROCEDURE — 31575 PR LARYNGOSCOPY, FLEXIBLE; DIAGNOSTIC: ICD-10-PCS | Mod: S$GLB,,, | Performed by: NURSE PRACTITIONER

## 2019-10-01 PROCEDURE — 99999 PR PBB SHADOW E&M-EST. PATIENT-LVL III: CPT | Mod: PBBFAC,,, | Performed by: NURSE PRACTITIONER

## 2019-10-01 PROCEDURE — 3008F BODY MASS INDEX DOCD: CPT | Mod: CPTII,S$GLB,, | Performed by: FAMILY MEDICINE

## 2019-10-01 PROCEDURE — 3008F PR BODY MASS INDEX (BMI) DOCUMENTED: ICD-10-PCS | Mod: CPTII,S$GLB,, | Performed by: NURSE PRACTITIONER

## 2019-10-01 PROCEDURE — 99214 PR OFFICE/OUTPT VISIT, EST, LEVL IV, 30-39 MIN: ICD-10-PCS | Mod: S$GLB,,, | Performed by: FAMILY MEDICINE

## 2019-10-01 PROCEDURE — 99203 OFFICE O/P NEW LOW 30 MIN: CPT | Mod: 25,S$GLB,, | Performed by: NURSE PRACTITIONER

## 2019-10-01 PROCEDURE — 99203 PR OFFICE/OUTPT VISIT, NEW, LEVL III, 30-44 MIN: ICD-10-PCS | Mod: 25,S$GLB,, | Performed by: NURSE PRACTITIONER

## 2019-10-01 PROCEDURE — 99999 PR PBB SHADOW E&M-EST. PATIENT-LVL III: ICD-10-PCS | Mod: PBBFAC,,, | Performed by: FAMILY MEDICINE

## 2019-10-01 PROCEDURE — 31575 DIAGNOSTIC LARYNGOSCOPY: CPT | Mod: S$GLB,,, | Performed by: NURSE PRACTITIONER

## 2019-10-01 PROCEDURE — 99214 OFFICE O/P EST MOD 30 MIN: CPT | Mod: S$GLB,,, | Performed by: FAMILY MEDICINE

## 2019-10-01 PROCEDURE — 3008F PR BODY MASS INDEX (BMI) DOCUMENTED: ICD-10-PCS | Mod: CPTII,S$GLB,, | Performed by: FAMILY MEDICINE

## 2019-10-01 PROCEDURE — 99999 PR PBB SHADOW E&M-EST. PATIENT-LVL III: CPT | Mod: PBBFAC,,, | Performed by: FAMILY MEDICINE

## 2019-10-01 PROCEDURE — 99999 PR PBB SHADOW E&M-EST. PATIENT-LVL III: ICD-10-PCS | Mod: PBBFAC,,, | Performed by: NURSE PRACTITIONER

## 2019-10-01 RX ORDER — LEVOTHYROXINE SODIUM 150 UG/1
150 TABLET ORAL DAILY
Qty: 30 TABLET | Refills: 1 | Status: SHIPPED | OUTPATIENT
Start: 2019-10-01 | End: 2020-03-27

## 2019-10-01 RX ORDER — FLUTICASONE PROPIONATE 50 MCG
1 SPRAY, SUSPENSION (ML) NASAL DAILY
Qty: 1 BOTTLE | Refills: 11 | Status: SHIPPED | OUTPATIENT
Start: 2019-10-01 | End: 2019-10-31

## 2019-10-01 NOTE — PROGRESS NOTES
Subjective:       Patient ID: Mundo Price is a 38 y.o. male.    Chief Complaint: pain with swallowing (feels like something stuck in throat)    HPI     Mundo Price is a 38 year old male who was [facility] for a 1 month history of odynophagia. This occurs only on the right side. It mainly happens with the first few bites of food or drinks of water. It also happens with saliva. The pain will decrease as he continues to eat or drink. This problem is not worsening in severity, but it is persistent. He does not have a sore throat, hoarse voice, or otalgia. He does not have any dysphagia. There is no adenopathy. He will often feel the pain on the outside of his neck on the right, as if it is in the muscles. He denies heartburn or reflux. He does report that he has frequent post nasal drip. He is a former smoker.     Past Medical History:   Diagnosis Date    Known health problems: none     Optic neuritis     Optic neuritis     Thyroid disease        Past Surgical History:   Procedure Laterality Date    CLAVICLE SURGERY Right 2014    has plate/hardware    HERNIA REPAIR Right 1992    inguinal     REFRACTIVE SURGERY Bilateral     PRK 2003 OU     TONSILLECTOMY           Current Outpatient Medications:     fluticasone propionate (FLONASE) 50 mcg/actuation nasal spray, 1 spray (50 mcg total) by Each Nostril route once daily., Disp: 1 Bottle, Rfl: 11    levothyroxine (SYNTHROID) 150 MCG tablet, Take 1 tablet (150 mcg total) by mouth once daily., Disp: 30 tablet, Rfl: 1    vitamin D (VITAMIN D3) 1000 units Tab, Take 3,000 Units by mouth once daily. 3000-4000iu daily, Disp: , Rfl:     Review of patient's allergies indicates:   Allergen Reactions    Ciprofloxacin      Fatigued, GI upset       Social History     Socioeconomic History    Marital status:      Spouse name: Not on file    Number of children: 0    Years of education: Not on file    Highest education level: Not  on file   Occupational History     Comment:  in    Social Needs    Financial resource strain: Not hard at all    Food insecurity:     Worry: Never true     Inability: Never true    Transportation needs:     Medical: No     Non-medical: No   Tobacco Use    Smoking status: Former Smoker     Years: 12.00     Types: Cigarettes     Last attempt to quit: 2016     Years since quittin.8    Smokeless tobacco: Never Used    Tobacco comment: would smoke socially   Substance and Sexual Activity    Alcohol use: Yes     Frequency: Never     Drinks per session: Patient refused     Binge frequency: Never     Comment: social    Drug use: No    Sexual activity: Yes     Partners: Female     Birth control/protection: None     Comment: wife   Lifestyle    Physical activity:     Days per week: 5 days     Minutes per session: 150+ min    Stress: Only a little   Relationships    Social connections:     Talks on phone: Once a week     Gets together: Never     Attends Jain service: Not on file     Active member of club or organization: No     Attends meetings of clubs or organizations: Never     Relationship status:    Other Topics Concern    Not on file   Social History Narrative    Not on file       Family History   Problem Relation Age of Onset    Hypothyroidism Father     Hypothyroidism Sister     No Known Problems Mother     Heart attack Maternal Grandfather     Dementia Paternal Grandmother     Cancer Maternal Aunt         unknown type       Review of Systems   Constitutional: Negative for appetite change, chills, diaphoresis, fatigue, fever and unexpected weight change.   HENT: Positive for postnasal drip. Negative for congestion, dental problem, drooling, ear discharge, ear pain, facial swelling, hearing loss, mouth sores, nosebleeds, rhinorrhea, sinus pressure, sneezing, sore throat, tinnitus, trouble swallowing and voice change.    Eyes: Negative for pain, discharge,  redness and itching.   Respiratory: Negative for cough and shortness of breath.    Cardiovascular: Negative for chest pain.   Gastrointestinal: Negative for abdominal distention, abdominal pain, diarrhea, nausea and vomiting.   Endocrine: Negative for cold intolerance and heat intolerance.   Genitourinary: Negative for difficulty urinating.   Musculoskeletal: Negative for neck pain and neck stiffness.   Skin: Negative for rash.   Neurological: Negative for dizziness, weakness and headaches.        Muscle twitching   Hematological: Negative for adenopathy.       Objective:      Physical Exam   Constitutional: He is oriented to person, place, and time. He appears well-developed and well-nourished. He is cooperative. He does not appear ill. No distress.   HENT:   Head: Normocephalic and atraumatic.   Right Ear: Hearing, tympanic membrane, external ear and ear canal normal.   Left Ear: Hearing, tympanic membrane, external ear and ear canal normal.   Nose: Nose normal. No mucosal edema, rhinorrhea or nasal deformity. No epistaxis.  No foreign bodies. Right sinus exhibits no maxillary sinus tenderness and no frontal sinus tenderness. Left sinus exhibits no maxillary sinus tenderness and no frontal sinus tenderness.   Mouth/Throat: Uvula is midline, oropharynx is clear and moist and mucous membranes are normal. Mucous membranes are not pale, not dry and not cyanotic. He does not have dentures. No oral lesions. No trismus in the jaw. Normal dentition. No uvula swelling or dental caries. No oropharyngeal exudate, posterior oropharyngeal edema, posterior oropharyngeal erythema or tonsillar abscesses.   Procedure: Flexible laryngoscopy  In order to fully examine the upper aerodigestive tract, including the larynx, in a patient with a hyperactive gag reflex, flexible endoscopy is required.  After explaining the procedure and obtaining verbal consent, a timeout was performed with the patient's participation according to the  universal protocol. Both nasal cavities were anesthetized with 4% Xylocaine spray mixed with Garcia-Synephrine. The flexible laryngoscope (#[id number]) was inserted into the nasal cavity and advanced to visualize the nasal cavity, nasopharynx, the posterior oropharynx, hypopharynx, and the endolarynx with the above findings noted. The scope was removed and the procedure terminated. The patient tolerated this procedure well without apparent complication.      FINDINGS  Nasopharynx - the torus is clear. There are no lesions of the posterior wall.   Oropharynx - no lesions of the tongue base. There is no obvious fullness or asymmetry.  Hypopharynx - there are no lesions of the pyriform sinuses or postcricoid region    Larynx - there are no lesions of the supraglottic or glottic larynx. There is mild edema of the larynx. Vocal fold mobility is normal with complete closure.      Eyes: Conjunctivae are normal. Right eye exhibits no discharge. Left eye exhibits no discharge. No scleral icterus.   Neck: Trachea normal, normal range of motion and phonation normal. Neck supple. No JVD present. No tracheal tenderness present. No tracheal deviation present. No thyroid mass and no thyromegaly present.   Salivary glands - there are no lesions or asymmetric findings in the submandibular or parotid glands     Cardiovascular: Normal rate.   Pulmonary/Chest: Effort normal. No stridor. No respiratory distress.   Lymphadenopathy:        Head (right side): No submental, no submandibular, no tonsillar and no preauricular adenopathy present.        Head (left side): No submental, no submandibular, no tonsillar and no preauricular adenopathy present.     He has no cervical adenopathy.   Neurological: He is alert and oriented to person, place, and time. No cranial nerve deficit.   Skin: Skin is warm and dry. No rash noted. He is not diaphoretic. No erythema. No pallor.   Psychiatric: He has a normal mood and affect. His behavior is normal.  Thought content normal.   Vitals reviewed.      Assessment:       1. Odynophagia        Plan:       Problem List Items Addressed This Visit        GI    Odynophagia     38 year old male with odynophagia. Flexible laryngoscopy is normal. We discussed that his symptoms could be related to throat irritation from post nasal drip. I have prescribed Flonase. He will try this and let me know if his symptoms worsen or do not improve. Questions answered.

## 2019-10-01 NOTE — PROGRESS NOTES
Subjective:       Patient ID: Mundo Price is a 38 y.o. male.    Chief Complaint: throat pain, twitching     HPI  Mundo Price is a 38 y.o. year old male with recent Hx of vision loss (optic neuritis?) and hypothyroid who presents today complaining of throat pain and twitching.     1 month ago began with a sore throat on the right side of his neck. Says he feels the pain and that something is pressing on the area when he swallows. It does not bother him much when he does not swallow. Denies neck swelling or lumps. Began out of the blue. No inciting event or trauma. No trouble swallowing.     Says he is still twitching all the time. Thumb was twitching, but resolved. Twitching travels to different parts of the body and come and go. Says it began when he starting taking levothyroxine.   Tried magnesium, B complex vitamins - took for a few days without relief.     Health Maintenance  Colon Cancer Screening: n/a  Prostate Cancer Screening: n/a  Hepatitis C screening: n/a  Flu vaccine:   Tetanus vaccine:  PNA vaccine: n/a  Shingles vaccine: n/a    Health Maintenance       Date Due Completion Date    TETANUS VACCINE 11/15/1998 ---    Influenza Vaccine (1) 09/01/2019 ---    Lipid Panel 01/04/2024 1/4/2019    Override on 5/1/2018: Done        I personally reviewed Past Medical History, Past Surgical History, Social History, and Family History    Review of Systems   Constitutional: Negative for chills, fatigue and fever.   HENT: Positive for sore throat. Negative for congestion, hearing loss, rhinorrhea and trouble swallowing.    Eyes: Negative for visual disturbance.   Respiratory: Negative for cough, shortness of breath and wheezing.    Cardiovascular: Negative for chest pain and palpitations.   Gastrointestinal: Negative for abdominal pain, constipation, diarrhea, nausea and vomiting.   Musculoskeletal:        +occasional muscle twitching    Skin: Negative for rash.   Neurological: Negative for dizziness,  "syncope and headaches.   Psychiatric/Behavioral: Negative for dysphoric mood and sleep disturbance. The patient is not nervous/anxious.        Objective:      Vitals:    10/01/19 1303   BP: 116/76   Pulse: 67   SpO2: 98%   Weight: 91.4 kg (201 lb 8 oz)   Height: 6' 1" (1.854 m)     Physical Exam   Constitutional: He is oriented to person, place, and time. He appears well-developed and well-nourished. No distress.   HENT:   Head: Normocephalic and atraumatic.   Right Ear: Hearing normal.   Left Ear: Hearing normal.   Nose: Nose normal.   Mouth/Throat: Oropharynx is clear and moist and mucous membranes are normal.   Eyes: Conjunctivae and EOM are normal.   Neck: Normal range of motion. Neck supple.       Cardiovascular: Normal rate, regular rhythm and normal heart sounds.   No murmur heard.  Pulmonary/Chest: Effort normal and breath sounds normal. No respiratory distress.   Neurological: He is alert and oriented to person, place, and time.   Skin: Skin is warm and dry.   Psychiatric: He has a normal mood and affect. His behavior is normal. Thought content normal.   Nursing note and vitals reviewed.      Assessment:       1. Pain with swallowing    2. Muscle twitching    3. Acquired hypothyroidism        Plan:     Pain with swallowing  - Refer to ENT for nasopharyngoscopy. I am unable to determine the etiology of patient's pain. No evidence of pharyngitis. No evidence of lymphadenopathy or thyromegaly. We did discuss possible neck US, but will hold off for now.   -     Ambulatory Referral to ENT    Muscle twitching  - Unclear etiology at this time. Patient thinks it is due to levothyroxine, since twitching began after he began treatment. Will switch to brand name Synthroid as below. May consider other thyroid replacement options if that does not help.  Discussed staying hydrated and trying magnesium or B complex supplementation.     Acquired hypothyroidism  -     levothyroxine (SYNTHROID) 150 MCG tablet; Take 1 tablet " (150 mcg total) by mouth once daily.  Dispense: 30 tablet; Refill: 1  -     T3, free; Future; Expected date: 10/01/2019  -     T4, free; Future; Expected date: 10/01/2019  -     TSH; Future; Expected date: 10/01/2019

## 2019-10-01 NOTE — LETTER
October 1, 2019      Brandi Stroud MD  [address]  [address]  [facility] [address]           [facility]  [address]  [facility] [address]  Phone: [phone]  Fax: [phone]          Patient: Mundo Price   MR Number: [id number]   YOB: 1980   Date of Visit: 10/1/2019       Dear Dr. Brandi Stroud:    Thank you for referring Mundo Price to me for evaluation. Attached you will find relevant portions of my assessment and plan of care.    If you have questions, please do not hesitate to call me. I look forward to following Mundo Price along with you.    Sincerely,    Kathi Solorio, NP    Enclosure  CC:  No Recipients    If you would like to receive this communication electronically, please contact [email or web address] or [phone] to request more information on [email or web address] Link access.    For providers and/or their staff who would like to refer a patient to [facility], please contact us through our one-stop-shop provider referral line, [facility], at [phone].    If you feel you have received this communication in error or would no longer like to receive these types of communications, please e-mail [email or web address]

## 2019-11-25 ENCOUNTER — HOSPITAL ENCOUNTER (OUTPATIENT)
Dept: RADIOLOGY | Facility: OTHER | Age: 39
Discharge: HOME | End: 2019-11-25
Attending: PSYCHIATRY & NEUROLOGY
Payer: COMMERCIAL

## 2019-11-25 DIAGNOSIS — R90.89 ABNORMAL FINDING ON MRI OF BRAIN: ICD-10-CM

## 2019-11-25 DIAGNOSIS — E03.9 HYPOTHYROIDISM, UNSPECIFIED TYPE: ICD-10-CM

## 2019-11-25 PROCEDURE — 25500020 PHARM REV CODE 255: Performed by: PSYCHIATRY & NEUROLOGY

## 2019-11-25 PROCEDURE — 70553 MRI BRAIN STEM W/O & W/DYE: CPT | Mod: TC

## 2019-11-25 PROCEDURE — 70553 MRI BRAIN DEMYELINATING W/ WO CONTRAST: ICD-10-PCS | Mod: 26,,, | Performed by: RADIOLOGY

## 2019-11-25 PROCEDURE — 70553 MRI BRAIN STEM W/O & W/DYE: CPT | Mod: 26,,, | Performed by: RADIOLOGY

## 2019-11-25 PROCEDURE — A9585 GADOBUTROL INJECTION: HCPCS | Performed by: PSYCHIATRY & NEUROLOGY

## 2019-11-25 RX ORDER — GADOBUTROL 604.72 MG/ML
9 INJECTION INTRAVENOUS
Status: COMPLETED | OUTPATIENT
Start: 2019-11-25 | End: 2019-11-25

## 2019-11-25 RX ADMIN — GADOBUTROL 9 ML: 604.72 INJECTION INTRAVENOUS at 10:11

## 2019-11-30 ENCOUNTER — PATIENT MESSAGE (OUTPATIENT)
Dept: INTERNAL MEDICINE | Facility: CLINIC | Age: 39
End: 2019-11-30

## 2020-03-27 DIAGNOSIS — E03.9 ACQUIRED HYPOTHYROIDISM: ICD-10-CM

## 2020-03-27 RX ORDER — LEVOTHYROXINE SODIUM 150 MCG
TABLET ORAL
Qty: 30 TABLET | Refills: 5 | Status: SHIPPED | OUTPATIENT
Start: 2020-03-27 | End: 2020-09-23

## 2020-09-23 DIAGNOSIS — E03.9 ACQUIRED HYPOTHYROIDISM: ICD-10-CM

## 2020-09-23 RX ORDER — LEVOTHYROXINE SODIUM 150 MCG
TABLET ORAL
Qty: 30 TABLET | Refills: 2 | Status: SHIPPED | OUTPATIENT
Start: 2020-09-23 | End: 2020-10-13

## 2020-10-08 ENCOUNTER — PATIENT MESSAGE (OUTPATIENT)
Dept: INTERNAL MEDICINE | Facility: CLINIC | Age: 40
End: 2020-10-08

## 2020-10-08 DIAGNOSIS — E03.9 ACQUIRED HYPOTHYROIDISM: Primary | ICD-10-CM

## 2020-10-09 ENCOUNTER — LAB VISIT (OUTPATIENT)
Dept: LAB | Facility: OTHER | Age: 40
End: 2020-10-09
Attending: INTERNAL MEDICINE
Payer: COMMERCIAL

## 2020-10-09 DIAGNOSIS — E03.9 ACQUIRED HYPOTHYROIDISM: ICD-10-CM

## 2020-10-09 LAB
T4 FREE SERPL-MCNC: 1.13 NG/DL (ref 0.71–1.51)
TSH SERPL DL<=0.005 MIU/L-ACNC: 4.2 UIU/ML (ref 0.4–4)

## 2020-10-09 PROCEDURE — 84439 ASSAY OF FREE THYROXINE: CPT

## 2020-10-09 PROCEDURE — 36415 COLL VENOUS BLD VENIPUNCTURE: CPT

## 2020-10-09 PROCEDURE — 84443 ASSAY THYROID STIM HORMONE: CPT

## 2020-10-13 DIAGNOSIS — E03.9 ACQUIRED HYPOTHYROIDISM: ICD-10-CM

## 2020-10-13 RX ORDER — LEVOTHYROXINE SODIUM 175 UG/1
175 TABLET ORAL
Qty: 30 TABLET | Refills: 1 | Status: SHIPPED | OUTPATIENT
Start: 2020-10-13 | End: 2020-11-04

## 2020-10-13 NOTE — PROGRESS NOTES
Hypothyroidism:  Is on Synthroid 150.  TSH borderline abnormal.  Since he feels off, will increase Synthroid to 175 with repeat TFTs.

## 2020-10-14 ENCOUNTER — TELEPHONE (OUTPATIENT)
Dept: INTERNAL MEDICINE | Facility: CLINIC | Age: 40
End: 2020-10-14

## 2020-10-31 ENCOUNTER — PATIENT MESSAGE (OUTPATIENT)
Dept: INTERNAL MEDICINE | Facility: CLINIC | Age: 40
End: 2020-10-31

## 2020-10-31 DIAGNOSIS — R20.0 HAND NUMBNESS: Primary | ICD-10-CM

## 2020-11-03 ENCOUNTER — OFFICE VISIT (OUTPATIENT)
Dept: INTERNAL MEDICINE | Facility: CLINIC | Age: 40
End: 2020-11-03
Payer: COMMERCIAL

## 2020-11-03 ENCOUNTER — TELEPHONE (OUTPATIENT)
Dept: INTERNAL MEDICINE | Facility: CLINIC | Age: 40
End: 2020-11-03

## 2020-11-03 ENCOUNTER — LAB VISIT (OUTPATIENT)
Dept: LAB | Facility: HOSPITAL | Age: 40
End: 2020-11-03
Payer: COMMERCIAL

## 2020-11-03 VITALS
SYSTOLIC BLOOD PRESSURE: 122 MMHG | HEART RATE: 70 BPM | HEIGHT: 72 IN | BODY MASS INDEX: 29.23 KG/M2 | WEIGHT: 215.81 LBS | DIASTOLIC BLOOD PRESSURE: 78 MMHG

## 2020-11-03 DIAGNOSIS — E03.9 ACQUIRED HYPOTHYROIDISM: ICD-10-CM

## 2020-11-03 DIAGNOSIS — R25.3 MUSCLE TWITCHING: ICD-10-CM

## 2020-11-03 DIAGNOSIS — E03.9 ACQUIRED HYPOTHYROIDISM: Primary | ICD-10-CM

## 2020-11-03 DIAGNOSIS — R20.2 LEFT LEG PARESTHESIAS: ICD-10-CM

## 2020-11-03 LAB
ALBUMIN SERPL BCP-MCNC: 4.9 G/DL (ref 3.5–5.2)
ALP SERPL-CCNC: 68 U/L (ref 55–135)
ALT SERPL W/O P-5'-P-CCNC: 102 U/L (ref 10–44)
ANION GAP SERPL CALC-SCNC: 13 MMOL/L (ref 8–16)
AST SERPL-CCNC: 47 U/L (ref 10–40)
BASOPHILS # BLD AUTO: 0.13 K/UL (ref 0–0.2)
BASOPHILS NFR BLD: 1.5 % (ref 0–1.9)
BILIRUB SERPL-MCNC: 1.7 MG/DL (ref 0.1–1)
BUN SERPL-MCNC: 12 MG/DL (ref 6–20)
CALCIUM SERPL-MCNC: 9.9 MG/DL (ref 8.7–10.5)
CHLORIDE SERPL-SCNC: 103 MMOL/L (ref 95–110)
CO2 SERPL-SCNC: 25 MMOL/L (ref 23–29)
CREAT SERPL-MCNC: 1 MG/DL (ref 0.5–1.4)
DIFFERENTIAL METHOD: ABNORMAL
EOSINOPHIL # BLD AUTO: 0.1 K/UL (ref 0–0.5)
EOSINOPHIL NFR BLD: 1.1 % (ref 0–8)
ERYTHROCYTE [DISTWIDTH] IN BLOOD BY AUTOMATED COUNT: 11.3 % (ref 11.5–14.5)
EST. GFR  (AFRICAN AMERICAN): >60 ML/MIN/1.73 M^2
EST. GFR  (NON AFRICAN AMERICAN): >60 ML/MIN/1.73 M^2
ESTIMATED AVG GLUCOSE: 94 MG/DL (ref 68–131)
GLUCOSE SERPL-MCNC: 83 MG/DL (ref 70–110)
HBA1C MFR BLD HPLC: 4.9 % (ref 4–5.6)
HCT VFR BLD AUTO: 56.1 % (ref 40–54)
HGB BLD-MCNC: 18.4 G/DL (ref 14–18)
IMM GRANULOCYTES # BLD AUTO: 0.27 K/UL (ref 0–0.04)
IMM GRANULOCYTES NFR BLD AUTO: 3.2 % (ref 0–0.5)
LYMPHOCYTES # BLD AUTO: 2.6 K/UL (ref 1–4.8)
LYMPHOCYTES NFR BLD: 30.3 % (ref 18–48)
MCH RBC QN AUTO: 31.8 PG (ref 27–31)
MCHC RBC AUTO-ENTMCNC: 32.8 G/DL (ref 32–36)
MCV RBC AUTO: 97 FL (ref 82–98)
MONOCYTES # BLD AUTO: 0.7 K/UL (ref 0.3–1)
MONOCYTES NFR BLD: 7.7 % (ref 4–15)
NEUTROPHILS # BLD AUTO: 4.8 K/UL (ref 1.8–7.7)
NEUTROPHILS NFR BLD: 56.2 % (ref 38–73)
NRBC BLD-RTO: 0 /100 WBC
PLATELET # BLD AUTO: 236 K/UL (ref 150–350)
PMV BLD AUTO: 9.8 FL (ref 9.2–12.9)
POTASSIUM SERPL-SCNC: 4.3 MMOL/L (ref 3.5–5.1)
PROT SERPL-MCNC: 8.6 G/DL (ref 6–8.4)
RBC # BLD AUTO: 5.78 M/UL (ref 4.6–6.2)
SODIUM SERPL-SCNC: 141 MMOL/L (ref 136–145)
T4 FREE SERPL-MCNC: 1.19 NG/DL (ref 0.71–1.51)
TSH SERPL DL<=0.005 MIU/L-ACNC: 1.97 UIU/ML (ref 0.4–4)
WBC # BLD AUTO: 8.52 K/UL (ref 3.9–12.7)

## 2020-11-03 PROCEDURE — 99999 PR PBB SHADOW E&M-EST. PATIENT-LVL IV: CPT | Mod: PBBFAC,,, | Performed by: STUDENT IN AN ORGANIZED HEALTH CARE EDUCATION/TRAINING PROGRAM

## 2020-11-03 PROCEDURE — 3008F PR BODY MASS INDEX (BMI) DOCUMENTED: ICD-10-PCS | Mod: CPTII,S$GLB,, | Performed by: STUDENT IN AN ORGANIZED HEALTH CARE EDUCATION/TRAINING PROGRAM

## 2020-11-03 PROCEDURE — 80053 COMPREHEN METABOLIC PANEL: CPT

## 2020-11-03 PROCEDURE — 84439 ASSAY OF FREE THYROXINE: CPT

## 2020-11-03 PROCEDURE — 99213 PR OFFICE/OUTPT VISIT, EST, LEVL III, 20-29 MIN: ICD-10-PCS | Mod: S$GLB,,, | Performed by: STUDENT IN AN ORGANIZED HEALTH CARE EDUCATION/TRAINING PROGRAM

## 2020-11-03 PROCEDURE — 86592 SYPHILIS TEST NON-TREP QUAL: CPT

## 2020-11-03 PROCEDURE — 86703 HIV-1/HIV-2 1 RESULT ANTBDY: CPT

## 2020-11-03 PROCEDURE — 3008F BODY MASS INDEX DOCD: CPT | Mod: CPTII,S$GLB,, | Performed by: STUDENT IN AN ORGANIZED HEALTH CARE EDUCATION/TRAINING PROGRAM

## 2020-11-03 PROCEDURE — 86803 HEPATITIS C AB TEST: CPT

## 2020-11-03 PROCEDURE — 84630 ASSAY OF ZINC: CPT

## 2020-11-03 PROCEDURE — 82607 VITAMIN B-12: CPT

## 2020-11-03 PROCEDURE — 83036 HEMOGLOBIN GLYCOSYLATED A1C: CPT

## 2020-11-03 PROCEDURE — 99213 OFFICE O/P EST LOW 20 MIN: CPT | Mod: S$GLB,,, | Performed by: STUDENT IN AN ORGANIZED HEALTH CARE EDUCATION/TRAINING PROGRAM

## 2020-11-03 PROCEDURE — 82746 ASSAY OF FOLIC ACID SERUM: CPT

## 2020-11-03 PROCEDURE — 84443 ASSAY THYROID STIM HORMONE: CPT

## 2020-11-03 PROCEDURE — 99999 PR PBB SHADOW E&M-EST. PATIENT-LVL IV: ICD-10-PCS | Mod: PBBFAC,,, | Performed by: STUDENT IN AN ORGANIZED HEALTH CARE EDUCATION/TRAINING PROGRAM

## 2020-11-03 PROCEDURE — 85025 COMPLETE CBC W/AUTO DIFF WBC: CPT

## 2020-11-03 PROCEDURE — 82525 ASSAY OF COPPER: CPT

## 2020-11-03 NOTE — TELEPHONE ENCOUNTER
Spoke to patient.   Patient is scheduled at [facility]. Patient is scheduled to see Dr. Barboza today at 1:00 pm. Patient stated that his hand numbness has been going on for a while now.

## 2020-11-03 NOTE — TELEPHONE ENCOUNTER
Patient seen another internal medication doctor today and they referred him to neurology. Patient appointment is scheduled for 02/2021.

## 2020-11-03 NOTE — PROGRESS NOTES
"Subjective:      Patient ID: Mundo Price is a 39 y.o. male.    Chief Complaint:  Thyroid Problem    History of Present Illness  Mundo Price is here for follow up of hypothyroidism.  Previously seen by Dr. Miller.  Last seen 8/29/2019.  This is their first visit with me.      Patient reports that around 10/2018 he was diagnosed with hypothyroidism. Since he started on thyroid hormone replacement he began experiencing vision changes "hazy", sporadic muscle twitches and tingling. This has happened on a daily basis, various locations. Does not associate any other signs or symptoms.    He was evaluated by neurology and had MRIs for evaluation of MS.     He was also evaluated by Dr. Luna in 2019.  I do not see mention of GO but he reports he was told that he may have it.     With regards to hypothyroidism:    Patient tried LT4 in the past. Switched to brand name Synthroid and has not noticed any benefit.     Lab Results   Component Value Date    TSH 1.968 11/03/2020    FREET4 1.19 11/03/2020     Current Medication:  Synthroid 175mcg daily (increased to this from 150 10/4/2020)     Takes thyroid medication properly without food first thing in the morning.     Current Symptoms:   - Weight gain  - Fatigue - improved since increase in dosage  - Constipation   - Hair loss  - Brittle nails  - Mental fog   - cp, palpations or sob  - Heat intolerance  - Cold intolerance    Review of Systems   Constitutional: Negative for fatigue.   Eyes: Negative for visual disturbance.   Respiratory: Negative for shortness of breath.    Cardiovascular: Negative for chest pain.   Gastrointestinal: Negative for abdominal pain.   Musculoskeletal: Negative for arthralgias.   Skin: Negative for wound.   Neurological: Negative for headaches.        Sporadic tingling/ muscle twitches   Hematological: Does not bruise/bleed easily.   Psychiatric/Behavioral: Negative for sleep disturbance.     Objective:   Physical Exam  Vitals signs " reviewed.   Neck:      Thyroid: No thyromegaly.   Cardiovascular:      Rate and Rhythm: Normal rate.      Comments: No edema present  Pulmonary:      Effort: Pulmonary effort is normal.   Abdominal:      Palpations: Abdomen is soft.         Visit Vitals  /86   Pulse 80   Ht 6' (1.829 m)   Wt 97 kg (213 lb 13.5 oz)   BMI 29.00 kg/m²       Body mass index is 29 kg/m².    Lab Review:   Lab Results   Component Value Date    HGBA1C 4.9 11/03/2020    HGBA1C 5.0 01/04/2019       Lab Results   Component Value Date    CHOL 243 (H) 01/04/2019    HDL 47 01/04/2019    LDLCALC 163.6 (H) 01/04/2019    TRIG 162 (H) 01/04/2019    CHOLHDL 19.3 (L) 01/04/2019     Lab Results   Component Value Date     11/03/2020    K 4.3 11/03/2020     11/03/2020    CO2 25 11/03/2020    GLU 83 11/03/2020    BUN 12 11/03/2020    CREATININE 1.0 11/03/2020    CALCIUM 9.9 11/03/2020    PROT 8.6 (H) 11/03/2020    ALBUMIN 4.9 11/03/2020    BILITOT 1.7 (H) 11/03/2020    ALKPHOS 68 11/03/2020    AST 47 (H) 11/03/2020     (H) 11/03/2020    ANIONGAP 13 11/03/2020    ESTGFRAFRICA >60.0 11/03/2020    EGFRNONAA >60.0 11/03/2020    TSH 1.968 11/03/2020     Vit D, 25-Hydroxy   Date Value Ref Range Status   11/28/2018 26 (L) 30 - 96 ng/mL Final     Comment:     Vitamin D deficiency.........<10 ng/mL                              Vitamin D insufficiency......10-29 ng/mL       Vitamin D sufficiency........> or equal to 30 ng/mL  Vitamin D toxicity............>100 ng/mL       Assessment and Plan     1. Acquired hypothyroidism  Ambulatory referral/consult to Endocrinology    Thyroid Peroxidase Antibody    Thyrotropin Receptor Antibody    TSH    TIROSINT 175 mcg Cap     Acquired hypothyroidism  -- Labs in 4 weeks.  -- Patient would like to try alternative medication. Will switch to Tirosint.  -- Will check TRAB and TPO.  -- Low suspicion the generalized signs and symptoms are related to his hypothyroidism.   -- Medication Changes:   Tirosint  175mcg daily.  -- Biochemically euthyroid.  -- Goal is a normal TSH.  -- Avoid exogenous hyperthyroidism as this can accelerate bone loss and increase risk of CV complications.  -- Advised to take LT4 on an empty stomach with water and to wait 30-45 minutes before eating or taking other medications   -- Reviewed usual times of thyroid hormone changes  -- Reviewed that symptoms of hypothyroidism may not correlate with tsh, and a normal TSH is the goal of therapy. Symptoms are not a justification for over treatment.    No follow-ups on file.

## 2020-11-03 NOTE — TELEPHONE ENCOUNTER
----- Message from Martha Hadley sent at 11/3/2020  9:21 AM CST -----  Name of Who is Calling: DAVID AQUINO [[id number]]    What is the request in detail:DAVID AQUINO [[id number]] is requesting a call back in regards to numbness in right foot and Left pinky Finger ....  Please contact to further discuss and advise      Can the clinic reply by [facility]: no    [facility]  [phone] (home)

## 2020-11-03 NOTE — PATIENT INSTRUCTIONS
"  You will have labs drawn to help determine other possible causes of your symptoms.     You will be referred to neurology for evaluation of symptoms prior to ordering repeat imaging.     You will also be referred to endocrinology here to discuss other treatment options for hypothyroidism.   Please consider contacting these endocrinology providers for a second opinion if desired:   · [facility] Endocrinology: [phone]  · [facility]: [phone]    Paraesthesias  Paraesthesia is a burning or prickling sensation that is sometimes felt in the hands, arms, legs or feet. It can also occur in other parts of the body. It can also feel like tingling or numbness, skin crawling, or itching. The feeling is not comfortable, but it is not painful. (The "pins and needles" feeling that happens when a foot or hand "falls asleep" is a temporary paraesthesia.)  Paraesthesias that last or come and go may be caused by medical issues that need to be treated. These include stroke, a bulging disk pressing on a nerve, a trapped nerve, vitamin deficiencies, or even certain medicines.  Tests are often done. These tests may include blood tests, X-ray, CT (computerized tomography) scan, or a muscle test (electromyography). Depending on the cause, treatment may include physical therapy.  Home care  · Tell the healthcare provider about all medicines you take. This includes prescription and over-the-counter medicines, vitamins, and herbs. Ask if any of the medicines may be causing your problems. Do not make any changes to prescription medicines without talking to your healthcare provider first.  · You may be prescribed medicines to help relieve the tingling feeling or for pain. Take all medicines as directed.  · A numb hand or foot may be more prone to injury. To help protect it:  ¨ Always use oven mitts.  ¨ Test water with an unaffected hand or foot.  ¨ Use caution when trimming nails. File sharp areas.  ¨ Wear shoes that " fit well to avoid pressure points, blisters, and ulcers.  ¨ Inspect your hands and feet carefully (including the soles of your feet and between your toes) at least once a week. If you see red areas, sores, or other problems, tell your healthcare provider.  Follow-up care  Follow up with your doctor or as advised by our staff. You may need further testing or evaluation.  When to seek medical advice  Call your healthcare provider right away if any of the following occur:  · Numbness or weakness of the face, one arm, or one leg  · Slurred speech, confusion, trouble speaking, walking, or seeing  · Severe headache, fainting spell, dizziness, or seizure  · Chest, arm, neck, or upper back pain  · Loss of bladder or bowel control  · Open wound with redness, swelling, or pus  Date Last Reviewed: 9/25/2015  © 9069-8624 [employer]. [address] [place] [address], [place] [address]. All rights reserved. This information is not intended as a substitute for professional medical care. Always follow your healthcare professional's instructions.

## 2020-11-03 NOTE — PROGRESS NOTES
RESIDENT [facility]IC URGENT CARE VISIT PROGRESS NOTE    Name: Mundo Price  : 1980  Date of Service: 2020   PCP: Brandi Stroud MD    Reason for visit:   Chief Complaint   Patient presents with    Numbness       HPI: Mr. Mundo Price is a 39 year old male presenting with a chief complaint of numbness. He has a PMH of acquired hypothyroidism, and reported history of optic neuritis of unclear etiology in 2018 (status post treatment with steroids with resolution). He is an established patient of Dr. Stroud and was last seen by her in 10/2019.     He reports developing left eye vision loss in 10/2018 associated with his initial diagnosis of hypothyroidism. He was treated with a three day course of steroids and initiated on SYNTHROID with resolution of left eye vision defects over a period of three months. However, initiation of SYNTHROID was associated with development of intermittent twitching of the lips, distal tongue, bilateral upper and lower extremities, and toes that occur almost daily. He then developed an acute onset of right facial numbness involving the mid-face, under eye, cheek and lower face in 2018 associated with MRI at that time showing non-specific punctate lesions in temporal white matter bilaterally; symptoms resolved spontaneously over a 4-5 hours. He was then evaluated by neurology here in 2018 for suspected multiple sclerosis. However, work-up including CBC, CMP, SANGEETA, screening for diabetes, and NMO antibodies were all unremarkable; no LP was performed. He was evaluated by ophthalmology here in 2018 without evidence of optic neuritis or stigmata of ocular multiple sclerosis. His muscle twitching continued and he was then evaluated by endocrinology here in 2019 for possible adverse reaction to SYNTHROID. He was noted to have elevated RT3 at that time, however he was overall clinically euthyroid; recommendations were against changing to alternative  thyroid hormone replacement. His muscle twitching has continued despite attempting to change between generic and name-brand Levothyroxine. He has undergone multiple MRI's with demyelinating protocol (most recently in 11/2019) without abnormality noted to explain symptoms. He then developed an increasing frequency of intermittent numbness of the anterior surface of his left fifth finger in addition to the heal of his left foot since approximately 10/2020. He reports the issue with numbness of his left fifth finger first occurring in 10/2018, but then spontaneously resolved. He describes the numbness as a decrease in sensation and increase in pin-prick/tingling sensation to the areas. He reports on-going intermittent left sided headaches since 10/2018 occurring weekly at most that are mostly aggravated by bright lights and resolve after 45 minutes - 1 hour with use of Ibuprofen.     He otherwise denies symptom association with recurrent facial weakness/drooping, blurry vision, loss of vision, pain with swallowing, changes in voice, cough, choking sensation with swallowing, fevers, chills, night sweats, unintentional weight loss, cough, history of TB or STD's, bowel or bladder incontinence, changes in gait, need for assistance with ambulation, and new onset of falls.     Medications:   Current Outpatient Medications:     levothyroxine (SYNTHROID, LEVOTHROID) 175 MCG tablet, Take 1 tablet (175 mcg total) by mouth before breakfast., Disp: 30 tablet, Rfl: 1    vitamin D (VITAMIN D3) 1000 units Tab, Take 3,000 Units by mouth once daily. 3000-4000iu daily, Disp: , Rfl:      Review of Systems:   Review of Systems   Constitutional: Negative for chills, fever, malaise/fatigue and weight loss.   HENT: Negative for congestion, hearing loss and sore throat.    Eyes: Negative for blurred vision, double vision, discharge and redness.   Respiratory: Negative for cough, sputum production and shortness of breath.    Cardiovascular:  Negative for chest pain, palpitations and leg swelling.   Gastrointestinal: Negative for abdominal pain, diarrhea, heartburn, nausea and vomiting.   Genitourinary: Negative for dysuria and urgency.   Musculoskeletal: Negative for back pain, myalgias and neck pain.   Skin: Negative for rash.   Neurological: Positive for tingling, tremors and headaches. Negative for dizziness, sensory change, speech change, focal weakness, seizures, loss of consciousness and weakness.   Endo/Heme/Allergies: Negative for polydipsia.       Vitals:   Vitals:    11/03/20 1309   BP: 122/78   BP Location: Right arm   Patient Position: Sitting   Pulse: 70   Weight: 97.9 kg (215 lb 13.3 oz)   Height: 6' (1.829 m)     Body mass index is 29.27 kg/m².    Physical Exam:   Physical Exam  Constitutional:       General: He is not in acute distress.     Appearance: Normal appearance.   HENT:      Head: Normocephalic and atraumatic.   Eyes:      Conjunctiva/sclera: Conjunctivae normal.      Pupils: Pupils are equal, round, and reactive to light.   Cardiovascular:      Rate and Rhythm: Normal rate and regular rhythm.      Pulses: Normal pulses.      Heart sounds: Normal heart sounds.   Pulmonary:      Effort: Pulmonary effort is normal. No respiratory distress.      Breath sounds: Normal breath sounds.   Abdominal:      General: Bowel sounds are normal. There is no distension.      Palpations: Abdomen is soft.      Tenderness: There is no abdominal tenderness.   Skin:     General: Skin is warm and dry.      Findings: No bruising or rash.   Neurological:      Mental Status: He is alert and oriented to person, place, and time.      Cranial Nerves: Cranial nerves are intact.      Sensory: No sensory deficit.      Motor: No weakness, tremor, atrophy or abnormal muscle tone.      Gait: Gait normal.      Deep Tendon Reflexes: Babinski sign absent on the right side. Babinski sign absent on the left side.         Assessment/Plan:   This is a 39 year old   male with PMH significant for acquired hypothyroidism (with history of left eye vision abnormalities at time of initial diagnosis in 10/2018 that progressively improved with thyroid hormone replacement therapy) who presented on 11/03/2020 for evaluation of on-going intermittent tremors/twitching of bilateral upper and lower extremities and tongue since SYNTHROID initiation and intermittent paresthesias of left hand and proximal foot. He is already status post evaluation by neurology without definitive laboratory or MRI brain abnormality to explain symptoms. His exam today is unremarkable, and without evidence of upper or lower motor neuron pathology. Differential diagnoses for presentation include secondary effect of vitamin deficiency, HIV, hepatitis, syphilis, primary neurological disorder, or side effect of SYNTHROID regimen.     Plan:   -CBC, CMP, A1c, TSH, and free T4 ordered.   -Screening for HIV, hepatitis C, syphilis, and vitamin deficiencies ordered.   -Recommended continuing current SYNTHROID dose for now and will have patient re-evaluated by endocrinology for consideration for possible alternative regimen, although other agents are not as ideal for maintaining euthyroid status.   -Repeat evaluation by neurology for possible further work-up of underlying etiology.     Disposition: I will contact patient with above results.     Discussed with Dr. Thompson.

## 2020-11-04 ENCOUNTER — OFFICE VISIT (OUTPATIENT)
Dept: ENDOCRINOLOGY | Facility: CLINIC | Age: 40
End: 2020-11-04
Payer: COMMERCIAL

## 2020-11-04 VITALS
WEIGHT: 213.88 LBS | DIASTOLIC BLOOD PRESSURE: 86 MMHG | HEART RATE: 80 BPM | SYSTOLIC BLOOD PRESSURE: 132 MMHG | HEIGHT: 72 IN | BODY MASS INDEX: 28.97 KG/M2

## 2020-11-04 DIAGNOSIS — E03.9 ACQUIRED HYPOTHYROIDISM: Primary | ICD-10-CM

## 2020-11-04 LAB
FOLATE SERPL-MCNC: 14.3 NG/ML (ref 4–24)
HCV AB SERPL QL IA: NEGATIVE
HIV 1+2 AB+HIV1 P24 AG SERPL QL IA: NEGATIVE
RPR SER QL: NORMAL
VIT B12 SERPL-MCNC: 663 PG/ML (ref 210–950)

## 2020-11-04 PROCEDURE — 99999 PR PBB SHADOW E&M-EST. PATIENT-LVL III: ICD-10-PCS | Mod: PBBFAC,,, | Performed by: NURSE PRACTITIONER

## 2020-11-04 PROCEDURE — 99213 PR OFFICE/OUTPT VISIT, EST, LEVL III, 20-29 MIN: ICD-10-PCS | Mod: S$GLB,,, | Performed by: NURSE PRACTITIONER

## 2020-11-04 PROCEDURE — 3008F BODY MASS INDEX DOCD: CPT | Mod: CPTII,S$GLB,, | Performed by: NURSE PRACTITIONER

## 2020-11-04 PROCEDURE — 99999 PR PBB SHADOW E&M-EST. PATIENT-LVL III: CPT | Mod: PBBFAC,,, | Performed by: NURSE PRACTITIONER

## 2020-11-04 PROCEDURE — 99213 OFFICE O/P EST LOW 20 MIN: CPT | Mod: S$GLB,,, | Performed by: NURSE PRACTITIONER

## 2020-11-04 PROCEDURE — 3008F PR BODY MASS INDEX (BMI) DOCUMENTED: ICD-10-PCS | Mod: CPTII,S$GLB,, | Performed by: NURSE PRACTITIONER

## 2020-11-04 RX ORDER — LEVOTHYROXINE SODIUM 175 UG/1
175 CAPSULE ORAL DAILY
Qty: 30 CAPSULE | Refills: 3 | Status: SHIPPED | OUTPATIENT
Start: 2020-11-04 | End: 2020-11-16

## 2020-11-04 NOTE — ASSESSMENT & PLAN NOTE
-- Labs in 4 weeks.  -- Patient would like to try alternative medication. Will switch to Tirosint.  -- Will check TRAB and TPO.  -- Low suspicion the generalized signs and symptoms are related to his hypothyroidism.   -- Medication Changes:   Tirosint 175mcg daily.  -- Biochemically euthyroid.  -- Goal is a normal TSH.  -- Avoid exogenous hyperthyroidism as this can accelerate bone loss and increase risk of CV complications.  -- Advised to take LT4 on an empty stomach with water and to wait 30-45 minutes before eating or taking other medications   -- Reviewed usual times of thyroid hormone changes  -- Reviewed that symptoms of hypothyroidism may not correlate with tsh, and a normal TSH is the goal of therapy. Symptoms are not a justification for over treatment.

## 2020-11-04 NOTE — LETTER
November 4, 2020      Trenton Barboza MD  [address]  [facility] [address]           [facility]  [address]  [facility] [address]  Phone: [phone]  Fax: [phone]          Patient: Mundo Price   MR Number: [id number]   YOB: 1980   Date of Visit: 11/4/2020       Dear Dr. Trenton Morales Parent:    Thank you for referring Mundo Price to me for evaluation. Attached you will find relevant portions of my assessment and plan of care.    If you have questions, please do not hesitate to call me. I look forward to following Mundo Price along with you.    Sincerely,    Mayte Baer NP    Enclosure  CC:  No Recipients    If you would like to receive this communication electronically, please contact [email or web address] or [phone] to request more information on [employer] access.    For providers and/or their staff who would like to refer a patient to [facility], please contact us through our one-stop-shop provider referral line, [facility], at [phone].    If you feel you have received this communication in error or would no longer like to receive these types of communications, please e-mail [email or web address]

## 2020-11-06 LAB
COPPER SERPL-MCNC: 1074 UG/L (ref 665–1480)
ZINC SERPL-MCNC: 92 UG/DL (ref 60–130)

## 2020-11-10 NOTE — PROGRESS NOTES
I have reviewed the notes, assessments, and/or procedures performed by Dr. Barboza, I concur with his documentation of Mundo Price.

## 2020-11-12 ENCOUNTER — TELEPHONE (OUTPATIENT)
Dept: PULMONOLOGY | Facility: HOSPITAL | Age: 40
End: 2020-11-12

## 2020-11-12 NOTE — TELEPHONE ENCOUNTER
Contacted patient to review lab results from urgent care visit.     He informed me of improving symptoms following thyroid medication adjustment by endocrinology. I informed him of unremarkable labs for other medical etiologies of symptoms.     He had no additional questions or concerns.     He has follow-up labs scheduled with endocrinology in 12/2020.         Trenton Braboza MD, PGY-III  Internal Medicine Resident  [facility]

## 2020-11-14 ENCOUNTER — PATIENT MESSAGE (OUTPATIENT)
Dept: ENDOCRINOLOGY | Facility: CLINIC | Age: 40
End: 2020-11-14

## 2020-11-16 RX ORDER — LEVOTHYROXINE SODIUM 175 UG/1
175 TABLET ORAL
Qty: 30 TABLET | Refills: 6 | Status: SHIPPED | OUTPATIENT
Start: 2020-11-16 | End: 2021-07-04

## 2020-11-25 ENCOUNTER — PATIENT MESSAGE (OUTPATIENT)
Dept: ENDOCRINOLOGY | Facility: CLINIC | Age: 40
End: 2020-11-25

## 2020-11-27 ENCOUNTER — PATIENT MESSAGE (OUTPATIENT)
Dept: INTERNAL MEDICINE | Facility: CLINIC | Age: 40
End: 2020-11-27

## 2020-12-11 ENCOUNTER — PATIENT MESSAGE (OUTPATIENT)
Dept: ENDOCRINOLOGY | Facility: CLINIC | Age: 40
End: 2020-12-11

## 2020-12-17 ENCOUNTER — PATIENT MESSAGE (OUTPATIENT)
Dept: ENDOCRINOLOGY | Facility: CLINIC | Age: 40
End: 2020-12-17

## 2020-12-17 DIAGNOSIS — E03.9 ACQUIRED HYPOTHYROIDISM: Primary | ICD-10-CM

## 2020-12-25 LAB
THYROPEROXIDASE AB SERPL-ACNC: 106 IU/ML (ref 0–34)
TSH RECEP AB SER-ACNC: <1.1 IU/L (ref 0–1.75)
TSH SERPL DL<=0.005 MIU/L-ACNC: 1.81 UIU/ML (ref 0.45–4.5)

## 2021-01-01 ENCOUNTER — HOSPITAL ENCOUNTER (EMERGENCY)
Facility: HOSPITAL | Age: 41
Discharge: HOME | End: 2021-01-01
Attending: EMERGENCY MEDICINE
Payer: COMMERCIAL

## 2021-01-01 VITALS
SYSTOLIC BLOOD PRESSURE: 126 MMHG | HEART RATE: 69 BPM | BODY MASS INDEX: 28.48 KG/M2 | RESPIRATION RATE: 20 BRPM | TEMPERATURE: 98 F | DIASTOLIC BLOOD PRESSURE: 74 MMHG | WEIGHT: 210 LBS | OXYGEN SATURATION: 96 %

## 2021-01-01 DIAGNOSIS — R11.2 NAUSEA AND VOMITING, INTRACTABILITY OF VOMITING NOT SPECIFIED, UNSPECIFIED VOMITING TYPE: Primary | ICD-10-CM

## 2021-01-01 DIAGNOSIS — R10.9 ABDOMINAL PAIN: ICD-10-CM

## 2021-01-01 LAB
ALBUMIN SERPL BCP-MCNC: 4.6 G/DL (ref 3.5–5.2)
ALP SERPL-CCNC: 64 U/L (ref 55–135)
ALT SERPL W/O P-5'-P-CCNC: 62 U/L (ref 10–44)
ANION GAP SERPL CALC-SCNC: 14 MMOL/L (ref 8–16)
AST SERPL-CCNC: 34 U/L (ref 10–40)
BASOPHILS # BLD AUTO: 0.14 K/UL (ref 0–0.2)
BASOPHILS NFR BLD: 1.2 % (ref 0–1.9)
BILIRUB SERPL-MCNC: 1.2 MG/DL (ref 0.1–1)
BUN SERPL-MCNC: 12 MG/DL (ref 6–20)
CALCIUM SERPL-MCNC: 9.6 MG/DL (ref 8.7–10.5)
CHLORIDE SERPL-SCNC: 103 MMOL/L (ref 95–110)
CO2 SERPL-SCNC: 23 MMOL/L (ref 23–29)
CREAT SERPL-MCNC: 0.9 MG/DL (ref 0.5–1.4)
DIFFERENTIAL METHOD: ABNORMAL
EOSINOPHIL # BLD AUTO: 0.1 K/UL (ref 0–0.5)
EOSINOPHIL NFR BLD: 1.2 % (ref 0–8)
ERYTHROCYTE [DISTWIDTH] IN BLOOD BY AUTOMATED COUNT: 11.4 % (ref 11.5–14.5)
EST. GFR  (AFRICAN AMERICAN): >60 ML/MIN/1.73 M^2
EST. GFR  (NON AFRICAN AMERICAN): >60 ML/MIN/1.73 M^2
GLUCOSE SERPL-MCNC: 115 MG/DL (ref 70–110)
HCT VFR BLD AUTO: 49.8 % (ref 40–54)
HGB BLD-MCNC: 16.2 G/DL (ref 14–18)
IMM GRANULOCYTES # BLD AUTO: 0.26 K/UL (ref 0–0.04)
IMM GRANULOCYTES NFR BLD AUTO: 2.2 % (ref 0–0.5)
LACTATE SERPL-SCNC: 2.6 MMOL/L (ref 0.5–2.2)
LIPASE SERPL-CCNC: 27 U/L (ref 4–60)
LYMPHOCYTES # BLD AUTO: 2.8 K/UL (ref 1–4.8)
LYMPHOCYTES NFR BLD: 24.3 % (ref 18–48)
MCH RBC QN AUTO: 30.9 PG (ref 27–31)
MCHC RBC AUTO-ENTMCNC: 32.5 G/DL (ref 32–36)
MCV RBC AUTO: 95 FL (ref 82–98)
MONOCYTES # BLD AUTO: 1 K/UL (ref 0.3–1)
MONOCYTES NFR BLD: 8.3 % (ref 4–15)
NEUTROPHILS # BLD AUTO: 7.3 K/UL (ref 1.8–7.7)
NEUTROPHILS NFR BLD: 62.8 % (ref 38–73)
NRBC BLD-RTO: 0 /100 WBC
PLATELET # BLD AUTO: 204 K/UL (ref 150–350)
PMV BLD AUTO: 9.3 FL (ref 9.2–12.9)
POTASSIUM SERPL-SCNC: 4.9 MMOL/L (ref 3.5–5.1)
PROT SERPL-MCNC: 8 G/DL (ref 6–8.4)
RBC # BLD AUTO: 5.24 M/UL (ref 4.6–6.2)
SODIUM SERPL-SCNC: 140 MMOL/L (ref 136–145)
WBC # BLD AUTO: 11.66 K/UL (ref 3.9–12.7)

## 2021-01-01 PROCEDURE — 85025 COMPLETE CBC W/AUTO DIFF WBC: CPT

## 2021-01-01 PROCEDURE — 25000003 PHARM REV CODE 250: Performed by: EMERGENCY MEDICINE

## 2021-01-01 PROCEDURE — 83690 ASSAY OF LIPASE: CPT

## 2021-01-01 PROCEDURE — 99285 EMERGENCY DEPT VISIT HI MDM: CPT | Mod: ,,, | Performed by: EMERGENCY MEDICINE

## 2021-01-01 PROCEDURE — 99285 EMERGENCY DEPT VISIT HI MDM: CPT | Mod: 25

## 2021-01-01 PROCEDURE — 83605 ASSAY OF LACTIC ACID: CPT

## 2021-01-01 PROCEDURE — 93010 EKG 12-LEAD: ICD-10-PCS | Mod: ,,, | Performed by: INTERNAL MEDICINE

## 2021-01-01 PROCEDURE — 80053 COMPREHEN METABOLIC PANEL: CPT

## 2021-01-01 PROCEDURE — 93010 ELECTROCARDIOGRAM REPORT: CPT | Mod: ,,, | Performed by: INTERNAL MEDICINE

## 2021-01-01 PROCEDURE — 93005 ELECTROCARDIOGRAM TRACING: CPT

## 2021-01-01 PROCEDURE — 96360 HYDRATION IV INFUSION INIT: CPT

## 2021-01-01 PROCEDURE — 99285 PR EMERGENCY DEPT VISIT,LEVEL V: ICD-10-PCS | Mod: ,,, | Performed by: EMERGENCY MEDICINE

## 2021-01-01 RX ORDER — MAG HYDROX/ALUMINUM HYD/SIMETH 200-200-20
5 SUSPENSION, ORAL (FINAL DOSE FORM) ORAL
Status: DISCONTINUED | OUTPATIENT
Start: 2021-01-01 | End: 2021-01-01 | Stop reason: HOSPADM

## 2021-01-01 RX ORDER — FAMOTIDINE 20 MG/1
20 TABLET, FILM COATED ORAL
Status: COMPLETED | OUTPATIENT
Start: 2021-01-01 | End: 2021-01-01

## 2021-01-01 RX ORDER — FAMOTIDINE 20 MG/1
20 TABLET, FILM COATED ORAL 2 TIMES DAILY
Qty: 20 TABLET | Refills: 0 | Status: SHIPPED | OUTPATIENT
Start: 2021-01-01 | End: 2021-02-24

## 2021-01-01 RX ORDER — SUCRALFATE 1 G/10ML
1 SUSPENSION ORAL
Status: COMPLETED | OUTPATIENT
Start: 2021-01-01 | End: 2021-01-01

## 2021-01-01 RX ORDER — ONDANSETRON 4 MG/1
4 TABLET, ORALLY DISINTEGRATING ORAL
Status: COMPLETED | OUTPATIENT
Start: 2021-01-01 | End: 2021-01-01

## 2021-01-01 RX ORDER — MAG HYDROX/ALUMINUM HYD/SIMETH 200-200-20
5 SUSPENSION, ORAL (FINAL DOSE FORM) ORAL
Status: DISCONTINUED | OUTPATIENT
Start: 2021-01-01 | End: 2021-01-01

## 2021-01-01 RX ORDER — ONDANSETRON 4 MG/1
4 TABLET, FILM COATED ORAL EVERY 6 HOURS
Qty: 12 TABLET | Refills: 0 | Status: SHIPPED | OUTPATIENT
Start: 2021-01-01 | End: 2021-02-24

## 2021-01-01 RX ADMIN — SUCRALFATE 1 G: 1 SUSPENSION ORAL at 02:01

## 2021-01-01 RX ADMIN — ONDANSETRON 4 MG: 4 TABLET, ORALLY DISINTEGRATING ORAL at 03:01

## 2021-01-01 RX ADMIN — FAMOTIDINE 20 MG: 20 TABLET, FILM COATED ORAL at 02:01

## 2021-01-01 RX ADMIN — ALUMINUM HYDROXIDE, MAGNESIUM HYDROXIDE, AND SIMETHICONE 5 ML: 200; 200; 20 SUSPENSION ORAL at 02:01

## 2021-01-01 RX ADMIN — SODIUM CHLORIDE 1000 ML: 0.9 INJECTION, SOLUTION INTRAVENOUS at 02:01

## 2021-02-12 ENCOUNTER — PATIENT MESSAGE (OUTPATIENT)
Dept: ENDOCRINOLOGY | Facility: CLINIC | Age: 41
End: 2021-02-12

## 2021-02-19 ENCOUNTER — PATIENT OUTREACH (OUTPATIENT)
Dept: ADMINISTRATIVE | Facility: OTHER | Age: 41
End: 2021-02-19

## 2021-02-24 ENCOUNTER — OFFICE VISIT (OUTPATIENT)
Dept: NEUROLOGY | Facility: CLINIC | Age: 41
End: 2021-02-24
Payer: COMMERCIAL

## 2021-02-24 VITALS
BODY MASS INDEX: 29.11 KG/M2 | WEIGHT: 214.94 LBS | HEIGHT: 72 IN | DIASTOLIC BLOOD PRESSURE: 79 MMHG | SYSTOLIC BLOOD PRESSURE: 126 MMHG | HEART RATE: 78 BPM

## 2021-02-24 DIAGNOSIS — H54.62 VISION LOSS OF LEFT EYE: ICD-10-CM

## 2021-02-24 DIAGNOSIS — R20.2 LEFT LEG PARESTHESIAS: ICD-10-CM

## 2021-02-24 DIAGNOSIS — R25.3 FASCICULATION: ICD-10-CM

## 2021-02-24 DIAGNOSIS — G57.12 MERALGIA PARESTHETICA OF LEFT SIDE: ICD-10-CM

## 2021-02-24 DIAGNOSIS — R25.3 MUSCLE TWITCHING: ICD-10-CM

## 2021-02-24 PROBLEM — R90.89 ABNORMAL FINDING ON MRI OF BRAIN: Status: RESOLVED | Noted: 2019-02-05 | Resolved: 2021-02-24

## 2021-02-24 PROCEDURE — 99999 PR PBB SHADOW E&M-EST. PATIENT-LVL III: CPT | Mod: PBBFAC,,, | Performed by: PSYCHIATRY & NEUROLOGY

## 2021-02-24 PROCEDURE — 99215 PR OFFICE/OUTPT VISIT, EST, LEVL V, 40-54 MIN: ICD-10-PCS | Mod: S$GLB,,, | Performed by: PSYCHIATRY & NEUROLOGY

## 2021-02-24 PROCEDURE — 1126F AMNT PAIN NOTED NONE PRSNT: CPT | Mod: S$GLB,,, | Performed by: PSYCHIATRY & NEUROLOGY

## 2021-02-24 PROCEDURE — 99215 OFFICE O/P EST HI 40 MIN: CPT | Mod: S$GLB,,, | Performed by: PSYCHIATRY & NEUROLOGY

## 2021-02-24 PROCEDURE — 1126F PR PAIN SEVERITY QUANTIFIED, NO PAIN PRESENT: ICD-10-PCS | Mod: S$GLB,,, | Performed by: PSYCHIATRY & NEUROLOGY

## 2021-02-24 PROCEDURE — 99999 PR PBB SHADOW E&M-EST. PATIENT-LVL III: ICD-10-PCS | Mod: PBBFAC,,, | Performed by: PSYCHIATRY & NEUROLOGY

## 2021-02-24 PROCEDURE — 3008F PR BODY MASS INDEX (BMI) DOCUMENTED: ICD-10-PCS | Mod: CPTII,S$GLB,, | Performed by: PSYCHIATRY & NEUROLOGY

## 2021-02-24 PROCEDURE — 3008F BODY MASS INDEX DOCD: CPT | Mod: CPTII,S$GLB,, | Performed by: PSYCHIATRY & NEUROLOGY

## 2021-02-25 ENCOUNTER — IMMUNIZATION (OUTPATIENT)
Dept: PHARMACY | Facility: CLINIC | Age: 41
End: 2021-02-25
Payer: COMMERCIAL

## 2021-02-25 DIAGNOSIS — Z23 NEED FOR VACCINATION: Primary | ICD-10-CM

## 2021-03-25 ENCOUNTER — IMMUNIZATION (OUTPATIENT)
Dept: PHARMACY | Facility: CLINIC | Age: 41
End: 2021-03-25

## 2021-03-25 DIAGNOSIS — Z23 NEED FOR VACCINATION: Primary | ICD-10-CM

## 2021-05-27 ENCOUNTER — PATIENT OUTREACH (OUTPATIENT)
Dept: ADMINISTRATIVE | Facility: HOSPITAL | Age: 41
End: 2021-05-27

## 2021-05-31 ENCOUNTER — PATIENT MESSAGE (OUTPATIENT)
Dept: PSYCHIATRY | Facility: CLINIC | Age: 41
End: 2021-05-31

## 2021-08-06 ENCOUNTER — CLINICAL SUPPORT (OUTPATIENT)
Dept: URGENT CARE | Facility: CLINIC | Age: 41
End: 2021-08-06
Payer: COMMERCIAL

## 2021-08-06 DIAGNOSIS — Z11.52 ENCOUNTER FOR SCREENING FOR COVID-19: Primary | ICD-10-CM

## 2021-08-06 LAB
CTP QC/QA: YES
SARS-COV-2 RDRP RESP QL NAA+PROBE: NEGATIVE

## 2021-08-06 PROCEDURE — U0002 COVID-19 LAB TEST NON-CDC: HCPCS | Mod: QW,S$GLB,, | Performed by: PHYSICIAN ASSISTANT

## 2021-08-06 PROCEDURE — U0002: ICD-10-PCS | Mod: QW,S$GLB,, | Performed by: PHYSICIAN ASSISTANT

## 2021-09-01 ENCOUNTER — PATIENT MESSAGE (OUTPATIENT)
Dept: PSYCHIATRY | Facility: CLINIC | Age: 41
End: 2021-09-01

## 2021-09-02 ENCOUNTER — PATIENT MESSAGE (OUTPATIENT)
Dept: PSYCHIATRY | Facility: CLINIC | Age: 41
End: 2021-09-02

## 2021-10-27 ENCOUNTER — PATIENT MESSAGE (OUTPATIENT)
Dept: ENDOCRINOLOGY | Facility: CLINIC | Age: 41
End: 2021-10-27
Payer: COMMERCIAL

## 2021-10-27 DIAGNOSIS — E03.9 ACQUIRED HYPOTHYROIDISM: Primary | ICD-10-CM

## 2021-12-03 ENCOUNTER — PATIENT MESSAGE (OUTPATIENT)
Dept: ENDOCRINOLOGY | Facility: CLINIC | Age: 41
End: 2021-12-03
Payer: COMMERCIAL

## 2021-12-03 LAB
THYROPEROXIDASE AB SERPL-ACNC: 80 IU/ML (ref 0–34)
TSH RECEP AB SER-ACNC: <1.1 IU/L (ref 0–1.75)
TSH SERPL DL<=0.005 MIU/L-ACNC: 0.73 UIU/ML (ref 0.45–4.5)

## 2022-02-10 ENCOUNTER — PATIENT MESSAGE (OUTPATIENT)
Dept: ENDOCRINOLOGY | Facility: CLINIC | Age: 42
End: 2022-02-10
Payer: COMMERCIAL

## 2022-02-10 DIAGNOSIS — E03.9 ACQUIRED HYPOTHYROIDISM: Primary | ICD-10-CM

## 2022-02-11 ENCOUNTER — PATIENT MESSAGE (OUTPATIENT)
Dept: ENDOCRINOLOGY | Facility: CLINIC | Age: 42
End: 2022-02-11
Payer: COMMERCIAL

## 2022-02-11 RX ORDER — LEVOTHYROXINE SODIUM 175 UG/1
175 TABLET ORAL
Qty: 90 TABLET | Refills: 0 | Status: SHIPPED | OUTPATIENT
Start: 2022-02-11 | End: 2022-03-03

## 2022-02-11 NOTE — PROGRESS NOTES
"Subjective:      Patient ID: Mundo Price is a 41 y.o. male.    Chief Complaint:  No chief complaint on file.    History of Present Illness  Mundo Price is here for follow up of hypothyroidism.  Previously seen by me 11/2020.  This is a MyChart video visit.    The patient location is: [place]  The chief complaint leading to consultation is: hypothyroidism  Visit type: Virtual visit with synchronous audio and video  Total time spent with patient: see below  Each patient to whom he or she provides medical services by telemedicine is:  (1) informed of the relationship between the physician and patient and the respective role of any other health care provider with respect to management of the patient; and (2) notified that he or she may decline to receive medical services by telemedicine and may withdraw from such care at any time.        Patient reports that around 10/2018 he was diagnosed with hypothyroidism. Since he started on thyroid hormone replacement he began experiencing vision changes "hazy", sporadic muscle twitches and tingling.   This has resolved since LOV 11/2020.    He was evaluated by neurology and had MRIs for evaluation of MS.     He was also evaluated by Dr. Luna in 2019.  I do not see mention of GO but he reports he was told that he may have it.     With regards to hypothyroidism:    Patient tried LT4 in the past. Switched to brand name Synthroid.    Lab Results   Component Value Date    TSH 0.735 12/01/2021    THYROIDAB 80 (H) 12/01/2021    FREET4 1.19 11/03/2020     Current Medication:  Synthroid 175mcg daily     Takes thyroid medication properly without food first thing in the morning.     Biotin Use: Denies    Current Symptoms:   - Weight gain  - Fatigue   - Constipation   - Hair loss  - Brittle nails  - Mental fog   - cp, palpations or sob  - Heat intolerance  - Cold intolerance    Review of Systems   as above    There were no vitals taken for this visit.    There is no height or weight " on file to calculate BMI.    Lab Review:   Lab Results   Component Value Date    HGBA1C 4.9 11/03/2020    HGBA1C 5.0 01/04/2019       Lab Results   Component Value Date    CHOL 243 (H) 01/04/2019    HDL 47 01/04/2019    LDLCALC 163.6 (H) 01/04/2019    TRIG 162 (H) 01/04/2019    CHOLHDL 19.3 (L) 01/04/2019     Lab Results   Component Value Date     01/01/2021    K 4.9 01/01/2021     01/01/2021    CO2 23 01/01/2021     (H) 01/01/2021    BUN 12 01/01/2021    CREATININE 0.9 01/01/2021    CALCIUM 9.6 01/01/2021    PROT 8.0 01/01/2021    ALBUMIN 4.6 01/01/2021    BILITOT 1.2 (H) 01/01/2021    ALKPHOS 64 01/01/2021    AST 34 01/01/2021    ALT 62 (H) 01/01/2021    ANIONGAP 14 01/01/2021    ESTGFRAFRICA >60.0 01/01/2021    EGFRNONAA >60.0 01/01/2021    TSH 0.735 12/01/2021     Vit D, 25-Hydroxy   Date Value Ref Range Status   11/28/2018 26 (L) 30 - 96 ng/mL Final     Comment:     Vitamin D deficiency.........<10 ng/mL                              Vitamin D insufficiency......10-29 ng/mL       Vitamin D sufficiency........> or equal to 30 ng/mL  Vitamin D toxicity............>100 ng/mL       Assessment and Plan     1. Acquired hypothyroidism       Acquired hypothyroidism  -- Medication Changes:   CONTINUE: Synthroid 175mcg daily  -- Clinically and biochemically euthyroid.  -- Goal is a normal TSH.  -- Avoid exogenous hyperthyroidism as this can accelerate bone loss and increase risk of CV complications.  -- Advised to take LT4 on an empty stomach with water and to wait 30-45 minutes before eating or taking other medications   -- Reviewed usual times of thyroid hormone changes  -- Reviewed that symptoms of hypothyroidism may not correlate with tsh, and a normal TSH is the goal of therapy. Symptoms are not a justification for over treatment.    Follow up in about 1 year (around 2/14/2023).      I spent 18 minutes face-to-face with the patient, over half of the visit was spent on counseling and/or coordinating  the care of the patient.    Counseling includes:  Diagnostic results, impressions, recommendations   Prognosis   Risk and benefits of management/treatment options   Instructions for management treatment and or follow-up   Importance of compliance with management   Risk factor reduction   Patient education

## 2022-02-14 ENCOUNTER — OFFICE VISIT (OUTPATIENT)
Dept: ENDOCRINOLOGY | Facility: CLINIC | Age: 42
End: 2022-02-14
Payer: COMMERCIAL

## 2022-02-14 DIAGNOSIS — E03.9 ACQUIRED HYPOTHYROIDISM: ICD-10-CM

## 2022-02-14 PROCEDURE — 99214 PR OFFICE/OUTPT VISIT, EST, LEVL IV, 30-39 MIN: ICD-10-PCS | Mod: 95,,, | Performed by: NURSE PRACTITIONER

## 2022-02-14 PROCEDURE — 1160F PR REVIEW ALL MEDS BY PRESCRIBER/CLIN PHARMACIST DOCUMENTED: ICD-10-PCS | Mod: CPTII,95,, | Performed by: NURSE PRACTITIONER

## 2022-02-14 PROCEDURE — 1160F RVW MEDS BY RX/DR IN RCRD: CPT | Mod: CPTII,95,, | Performed by: NURSE PRACTITIONER

## 2022-02-14 PROCEDURE — 1159F MED LIST DOCD IN RCRD: CPT | Mod: CPTII,95,, | Performed by: NURSE PRACTITIONER

## 2022-02-14 PROCEDURE — 1159F PR MEDICATION LIST DOCUMENTED IN MEDICAL RECORD: ICD-10-PCS | Mod: CPTII,95,, | Performed by: NURSE PRACTITIONER

## 2022-02-14 PROCEDURE — 99214 OFFICE O/P EST MOD 30 MIN: CPT | Mod: 95,,, | Performed by: NURSE PRACTITIONER

## 2022-02-14 NOTE — ASSESSMENT & PLAN NOTE
-- Medication Changes:   CONTINUE: Synthroid 175mcg daily  -- Clinically and biochemically euthyroid.  -- Goal is a normal TSH.  -- Avoid exogenous hyperthyroidism as this can accelerate bone loss and increase risk of CV complications.  -- Advised to take LT4 on an empty stomach with water and to wait 30-45 minutes before eating or taking other medications   -- Reviewed usual times of thyroid hormone changes  -- Reviewed that symptoms of hypothyroidism may not correlate with tsh, and a normal TSH is the goal of therapy. Symptoms are not a justification for over treatment.

## 2022-03-03 DIAGNOSIS — E03.9 ACQUIRED HYPOTHYROIDISM: ICD-10-CM

## 2022-03-03 RX ORDER — LEVOTHYROXINE SODIUM 175 UG/1
175 TABLET ORAL
Qty: 90 TABLET | Refills: 0 | Status: SHIPPED | OUTPATIENT
Start: 2022-03-03 | End: 2022-04-04 | Stop reason: SDUPTHER

## 2022-04-02 ENCOUNTER — PATIENT MESSAGE (OUTPATIENT)
Dept: ENDOCRINOLOGY | Facility: CLINIC | Age: 42
End: 2022-04-02
Payer: COMMERCIAL

## 2022-04-04 DIAGNOSIS — E03.9 ACQUIRED HYPOTHYROIDISM: ICD-10-CM

## 2022-04-04 RX ORDER — LEVOTHYROXINE SODIUM 175 UG/1
175 TABLET ORAL
Qty: 90 TABLET | Refills: 0 | Status: SHIPPED | OUTPATIENT
Start: 2022-04-04 | End: 2022-09-13 | Stop reason: SDUPTHER

## 2022-05-09 ENCOUNTER — PATIENT MESSAGE (OUTPATIENT)
Dept: ENDOCRINOLOGY | Facility: CLINIC | Age: 42
End: 2022-05-09
Payer: COMMERCIAL

## 2022-05-09 ENCOUNTER — TELEPHONE (OUTPATIENT)
Dept: ENDOCRINOLOGY | Facility: CLINIC | Age: 42
End: 2022-05-09
Payer: COMMERCIAL

## 2022-05-09 DIAGNOSIS — E03.9 ACQUIRED HYPOTHYROIDISM: Primary | ICD-10-CM

## 2022-05-09 NOTE — TELEPHONE ENCOUNTER
----- Message from Tiffanie Rodriguez sent at 5/9/2022 12:52 PM CDT -----  Regarding: speak with nurse  Contact: patient  [phone]   please call patient need labs scheduled outside of [facility] per patient insurance will not cover if performed at [facility] waiting on a call back from the nurse concerning this thanks.

## 2022-05-10 ENCOUNTER — TELEPHONE (OUTPATIENT)
Dept: ENDOCRINOLOGY | Facility: CLINIC | Age: 42
End: 2022-05-10
Payer: COMMERCIAL

## 2022-05-10 DIAGNOSIS — E03.9 ACQUIRED HYPOTHYROIDISM: Primary | ICD-10-CM

## 2022-05-10 NOTE — TELEPHONE ENCOUNTER
----- Message from Tracey Bailey MA sent at 5/10/2022  9:49 AM CDT -----  Regarding: FW: Labs  Place lab orders   ----- Message -----  From: Tracey Bailey MA  Sent: 5/10/2022   7:00 AM CDT  To: Tracey Bailey MA  Subject: FW: Labs                                           ----- Message -----  From: Eugenia Aj  Sent: 5/9/2022   4:44 PM CDT  To: Keyur Hu Staff  Subject: Labs                                             Pt requesting labs be sent to [facility]  Says insurance wont cover [facility]  [phone] (home)

## 2022-05-10 NOTE — TELEPHONE ENCOUNTER
----- Message from Tracey Bailey MA sent at 5/9/2022  4:45 PM CDT -----  Regarding: FW: Labs    ----- Message -----  From: Eugenia Aj  Sent: 5/9/2022   4:44 PM CDT  To: Keyur Hu Staff  Subject: Labs                                             Pt requesting labs be sent to [facility]  Says insurance wont cover [facility]  [phone] (home)

## 2022-05-11 ENCOUNTER — PATIENT MESSAGE (OUTPATIENT)
Dept: ENDOCRINOLOGY | Facility: CLINIC | Age: 42
End: 2022-05-11
Payer: COMMERCIAL

## 2022-05-11 DIAGNOSIS — E03.9 ACQUIRED HYPOTHYROIDISM: Primary | ICD-10-CM

## 2022-05-11 LAB
ALBUMIN SERPL-MCNC: 4.7 G/DL (ref 3.6–5.1)
ALBUMIN/GLOB SERPL: 1.7 (CALC) (ref 1–2.5)
ALP SERPL-CCNC: 60 U/L (ref 36–130)
ALT SERPL-CCNC: 41 U/L (ref 9–46)
AST SERPL-CCNC: 20 U/L (ref 10–40)
BILIRUB SERPL-MCNC: 1.1 MG/DL (ref 0.2–1.2)
BUN SERPL-MCNC: 15 MG/DL (ref 7–25)
BUN/CREAT SERPL: NORMAL (CALC) (ref 6–22)
CALCIUM SERPL-MCNC: 9.9 MG/DL (ref 8.6–10.3)
CHLORIDE SERPL-SCNC: 104 MMOL/L (ref 98–110)
CO2 SERPL-SCNC: 30 MMOL/L (ref 20–32)
CREAT SERPL-MCNC: 0.98 MG/DL (ref 0.6–1.35)
GLOBULIN SER CALC-MCNC: 2.8 G/DL (CALC) (ref 1.9–3.7)
GLUCOSE SERPL-MCNC: 91 MG/DL (ref 65–99)
HBA1C MFR BLD: 4.9 % OF TOTAL HGB
POTASSIUM SERPL-SCNC: 4.9 MMOL/L (ref 3.5–5.3)
PROT SERPL-MCNC: 7.5 G/DL (ref 6.1–8.1)
SODIUM SERPL-SCNC: 141 MMOL/L (ref 135–146)
TSH SERPL-ACNC: 0.22 MIU/L (ref 0.4–4.5)

## 2022-05-11 NOTE — TELEPHONE ENCOUNTER
Component      Latest Ref Rng & Units 5/10/2022   Glucose      65 - 99 mg/dL 91   BUN      7 - 25 mg/dL 15   Creatinine      0.60 - 1.35 mg/dL 0.98   eGFR if non       > OR = 60 mL/min/1.73m2 95   eGFR if       > OR = 60 mL/min/1.73m2 111   BUN/CREAT RATIO      6 - 22 (calc) NOT APPLICABLE   Sodium      135 - 146 mmol/L 141   Potassium      3.5 - 5.3 mmol/L 4.9   Chloride      98 - 110 mmol/L 104   CO2      20 - 32 mmol/L 30   Calcium      8.6 - 10.3 mg/dL 9.9   PROTEIN TOTAL      6.1 - 8.1 g/dL 7.5   Albumin      3.6 - 5.1 g/dL 4.7   Globulin, Total      1.9 - 3.7 g/dL (calc) 2.8   Albumin/Globulin Ratio      1.0 - 2.5 (calc) 1.7   BILIRUBIN TOTAL      0.2 - 1.2 mg/dL 1.1   Alkaline Phosphatase      36 - 130 U/L 60   AST      10 - 40 U/L 20   ALT      9 - 46 U/L 41   TSH      0.40 - 4.50 mIU/L 0.22 (L)   Hemoglobin A1C External      <5.7 % of total Hgb 4.9

## 2022-08-10 ENCOUNTER — PATIENT MESSAGE (OUTPATIENT)
Dept: GASTROENTEROLOGY | Facility: CLINIC | Age: 42
End: 2022-08-10
Payer: COMMERCIAL

## 2022-08-11 DIAGNOSIS — D22.9 CHANGE IN MOLE: Primary | ICD-10-CM

## 2022-09-13 DIAGNOSIS — E03.9 ACQUIRED HYPOTHYROIDISM: ICD-10-CM

## 2022-09-13 RX ORDER — LEVOTHYROXINE SODIUM 175 UG/1
175 TABLET ORAL
Qty: 90 TABLET | Refills: 0 | Status: SHIPPED | OUTPATIENT
Start: 2022-09-13 | End: 2022-09-14

## 2022-09-14 ENCOUNTER — TELEPHONE (OUTPATIENT)
Dept: ENDOCRINOLOGY | Facility: CLINIC | Age: 42
End: 2022-09-14
Payer: COMMERCIAL

## 2022-09-14 DIAGNOSIS — E03.9 ACQUIRED HYPOTHYROIDISM: Primary | ICD-10-CM

## 2022-09-14 RX ORDER — LEVOTHYROXINE SODIUM 175 UG/1
175 TABLET ORAL
Qty: 30 TABLET | Refills: 0 | Status: SHIPPED | OUTPATIENT
Start: 2022-09-14 | End: 2022-10-12 | Stop reason: SDUPTHER

## 2022-09-14 NOTE — TELEPHONE ENCOUNTER
----- Message from Tracey Bailey MA sent at 9/14/2022 10:24 AM CDT -----  Contact: [phone]  Change to generic brand   ----- Message -----  From: Kierra Clayton  Sent: 9/14/2022  10:13 AM CDT  To: Keyur Hu Staff    Pt needs different prescription, pharmacy does not cover name brand:  SYNTHROID 175 mcg tablet 90 tablet     States it does cover the generic brand. Wants to prescribe this one to patient asap so he will not go without medication.Says u can e-scribe directly to [facility] for:  L-levothyroxine tabs,       Also states that pt will still receive name brand that he requires , just needs  to send over the generic brand.     [facility] - [place], [place] - [address] [place] [id number]  Fax: [phone]    Phone: [phone]   Ref # [id number]

## 2022-09-18 LAB — TSH SERPL-ACNC: 4.17 MIU/L (ref 0.4–4.5)

## 2022-10-12 DIAGNOSIS — E03.9 ACQUIRED HYPOTHYROIDISM: ICD-10-CM

## 2022-10-12 RX ORDER — LEVOTHYROXINE SODIUM 175 UG/1
175 TABLET ORAL
Qty: 90 TABLET | Refills: 1 | Status: SHIPPED | OUTPATIENT
Start: 2022-10-12 | End: 2023-04-25

## 2023-06-06 ENCOUNTER — PATIENT MESSAGE (OUTPATIENT)
Dept: ENDOCRINOLOGY | Facility: CLINIC | Age: 43
End: 2023-06-06
Payer: COMMERCIAL

## 2023-06-06 DIAGNOSIS — E03.9 ACQUIRED HYPOTHYROIDISM: ICD-10-CM

## 2023-06-06 RX ORDER — LEVOTHYROXINE SODIUM 175 UG/1
175 TABLET ORAL
Qty: 90 TABLET | Refills: 0 | Status: SHIPPED | OUTPATIENT
Start: 2023-06-06 | End: 2023-06-12 | Stop reason: SDUPTHER

## 2023-06-12 DIAGNOSIS — E03.9 ACQUIRED HYPOTHYROIDISM: ICD-10-CM

## 2023-06-13 RX ORDER — LEVOTHYROXINE SODIUM 175 UG/1
175 TABLET ORAL
Qty: 90 TABLET | Refills: 0 | Status: SHIPPED | OUTPATIENT
Start: 2023-06-13 | End: 2023-06-14 | Stop reason: SDUPTHER

## 2023-06-14 DIAGNOSIS — E03.9 ACQUIRED HYPOTHYROIDISM: ICD-10-CM

## 2023-06-15 RX ORDER — LEVOTHYROXINE SODIUM 175 UG/1
175 TABLET ORAL
Qty: 90 TABLET | Refills: 0 | Status: SHIPPED | OUTPATIENT
Start: 2023-06-15 | End: 2023-07-20 | Stop reason: SDUPTHER

## 2023-07-17 NOTE — PROGRESS NOTES
Subjective:      Patient ID: Mundo Price is a 42 y.o. male.    Chief Complaint:  No chief complaint on file.    History of Present Illness  Mundo Price is here for follow up of hypothyroidism.  Previously seen by me 2/2022.  This is a MyChart video visit.    The patient location is: [place]  The chief complaint leading to consultation is: hypothyroidism  Visit type: Virtual visit with synchronous audio and video  Total time spent with patient: see below  Each patient to whom he or she provides medical services by telemedicine is:  (1) informed of the relationship between the physician and patient and the respective role of any other health care provider with respect to management of the patient; and (2) notified that he or she may decline to receive medical services by telemedicine and may withdraw from such care at any time.      With regards to hypothyroidism:      Lab Results   Component Value Date    TSH 4.17 09/16/2022    THYROIDAB 80 (H) 12/01/2021    FREET4 1.19 11/03/2020     Current Medication:  Synthroid 175mcg 1 tablet daily    Takes thyroid medication properly without food first thing in the morning.     Denies signs or symptoms of hyper- or hypothyroidism.     Review of Systems  as above    There were no vitals taken for this visit.    There is no height or weight on file to calculate BMI.    Lab Review:   Lab Results   Component Value Date    HGBA1C 4.9 05/10/2022    HGBA1C 4.9 11/03/2020    HGBA1C 5.0 01/04/2019       Lab Results   Component Value Date    CHOL 243 (H) 01/04/2019    HDL 47 01/04/2019    LDLCALC 163.6 (H) 01/04/2019    TRIG 162 (H) 01/04/2019    CHOLHDL 19.3 (L) 01/04/2019     Lab Results   Component Value Date     05/10/2022    K 4.9 05/10/2022     05/10/2022    CO2 30 05/10/2022    GLU 91 05/10/2022    BUN 15 05/10/2022    CREATININE 0.98 05/10/2022    CALCIUM 9.9 05/10/2022    PROT 7.5 05/10/2022    ALBUMIN 4.7 05/10/2022    BILITOT 1.1 05/10/2022    ALKPHOS 64  01/01/2021    AST 20 05/10/2022    ALT 41 05/10/2022    ANIONGAP 14 01/01/2021    ESTGFRAFRICA 111 05/10/2022    EGFRNONAA 95 05/10/2022    TSH 4.17 09/16/2022     Vit D, 25-Hydroxy   Date Value Ref Range Status   11/28/2018 26 (L) 30 - 96 ng/mL Final     Comment:     Vitamin D deficiency.........<10 ng/mL                              Vitamin D insufficiency......10-29 ng/mL       Vitamin D sufficiency........> or equal to 30 ng/mL  Vitamin D toxicity............>100 ng/mL       Assessment and Plan     1. Acquired hypothyroidism  TSH    TSH    SYNTHROID 175 mcg tablet        Acquired hypothyroidism  -- Labs now - [facility].  -- Medication Changes:   CONTINUE: Synthroid 175mcg daily  -- Clinically and biochemically euthyroid.  -- Goal is a normal TSH.  -- Avoid exogenous hyperthyroidism as this can accelerate bone loss and increase risk of CV complications.  -- Advised to take LT4 on an empty stomach with water and to wait 30-45 minutes before eating or taking other medications   -- Reviewed usual times of thyroid hormone changes  -- Reviewed that symptoms of hypothyroidism may not correlate with tsh, and a normal TSH is the goal of therapy. Symptoms are not a justification for over treatment.    No follow-ups on file.      I spent 15 minutes face-to-face with the patient, over half of the visit was spent on counseling and/or coordinating the care of the patient.    Counseling includes:  Diagnostic results, impressions, recommendations   Prognosis   Risk and benefits of management/treatment options   Instructions for management treatment and or follow-up   Importance of compliance with management   Risk factor reduction   Patient education

## 2023-07-20 ENCOUNTER — OFFICE VISIT (OUTPATIENT)
Dept: ENDOCRINOLOGY | Facility: CLINIC | Age: 43
End: 2023-07-20
Payer: COMMERCIAL

## 2023-07-20 DIAGNOSIS — E03.9 ACQUIRED HYPOTHYROIDISM: Primary | ICD-10-CM

## 2023-07-20 PROCEDURE — 1160F RVW MEDS BY RX/DR IN RCRD: CPT | Mod: CPTII,95,, | Performed by: NURSE PRACTITIONER

## 2023-07-20 PROCEDURE — 1159F MED LIST DOCD IN RCRD: CPT | Mod: CPTII,95,, | Performed by: NURSE PRACTITIONER

## 2023-07-20 PROCEDURE — 1160F PR REVIEW ALL MEDS BY PRESCRIBER/CLIN PHARMACIST DOCUMENTED: ICD-10-PCS | Mod: CPTII,95,, | Performed by: NURSE PRACTITIONER

## 2023-07-20 PROCEDURE — 99214 OFFICE O/P EST MOD 30 MIN: CPT | Mod: 95,,, | Performed by: NURSE PRACTITIONER

## 2023-07-20 PROCEDURE — 1159F PR MEDICATION LIST DOCUMENTED IN MEDICAL RECORD: ICD-10-PCS | Mod: CPTII,95,, | Performed by: NURSE PRACTITIONER

## 2023-07-20 PROCEDURE — 99214 PR OFFICE/OUTPT VISIT, EST, LEVL IV, 30-39 MIN: ICD-10-PCS | Mod: 95,,, | Performed by: NURSE PRACTITIONER

## 2023-07-20 RX ORDER — LEVOTHYROXINE SODIUM 175 UG/1
175 TABLET ORAL
Qty: 90 TABLET | Refills: 3 | Status: SHIPPED | OUTPATIENT
Start: 2023-07-20 | End: 2023-09-12 | Stop reason: SDUPTHER

## 2023-07-21 ENCOUNTER — PATIENT MESSAGE (OUTPATIENT)
Dept: ENDOCRINOLOGY | Facility: CLINIC | Age: 43
End: 2023-07-21
Payer: COMMERCIAL

## 2023-07-21 LAB — TSH SERPL-ACNC: 0.87 MIU/L (ref 0.4–4.5)

## 2023-09-12 DIAGNOSIS — E03.9 ACQUIRED HYPOTHYROIDISM: ICD-10-CM

## 2023-09-13 RX ORDER — LEVOTHYROXINE SODIUM 175 UG/1
175 TABLET ORAL
Qty: 90 TABLET | Refills: 3 | Status: SHIPPED | OUTPATIENT
Start: 2023-09-13

## 2023-09-21 ENCOUNTER — TELEPHONE (OUTPATIENT)
Dept: ENDOCRINOLOGY | Facility: CLINIC | Age: 43
End: 2023-09-21
Payer: COMMERCIAL

## 2023-09-21 NOTE — TELEPHONE ENCOUNTER
----- Message from Ash Real sent at 9/21/2023  8:22 AM CDT -----  Regarding: Pharmacy Authorization  Contact: [facility]   [facility] is calling in ref to pt's medication SYNTHROID 175 mcg tablet. Medication is written for brand only. [facility] says if that code was changed pt could receive brand for price of generic. Best Call Back Number:  [facility]    order # [id number]

## 2023-09-21 NOTE — TELEPHONE ENCOUNTER
Returned the call back to [facility] and spoke with Bam and he informed that the patient will like to remove brand name to get the [employer] copayment since the Band name was High . I informed Ms. Mayte she stated that was fine

## 2024-06-21 ENCOUNTER — PATIENT MESSAGE (OUTPATIENT)
Dept: ADMINISTRATIVE | Facility: OTHER | Age: 44
End: 2024-06-21
Payer: COMMERCIAL

## 2024-07-11 DIAGNOSIS — E03.9 ACQUIRED HYPOTHYROIDISM: ICD-10-CM

## 2024-07-12 ENCOUNTER — TELEPHONE (OUTPATIENT)
Dept: ENDOCRINOLOGY | Facility: CLINIC | Age: 44
End: 2024-07-12
Payer: COMMERCIAL

## 2024-07-12 RX ORDER — LEVOTHYROXINE SODIUM 175 UG/1
175 TABLET ORAL
Qty: 90 TABLET | Refills: 0 | Status: SHIPPED | OUTPATIENT
Start: 2024-07-12

## 2024-07-19 ENCOUNTER — OFFICE VISIT (OUTPATIENT)
Dept: OPTOMETRY | Facility: CLINIC | Age: 44
End: 2024-07-19
Payer: COMMERCIAL

## 2024-07-19 DIAGNOSIS — H52.4 ASTIGMATISM OF BOTH EYES WITH PRESBYOPIA: ICD-10-CM

## 2024-07-19 DIAGNOSIS — Z01.00 EYE EXAM, ROUTINE: Primary | ICD-10-CM

## 2024-07-19 DIAGNOSIS — H52.203 ASTIGMATISM OF BOTH EYES WITH PRESBYOPIA: ICD-10-CM

## 2024-07-19 DIAGNOSIS — H43.392 FLOATER, VITREOUS, LEFT: ICD-10-CM

## 2024-07-19 PROCEDURE — 1159F MED LIST DOCD IN RCRD: CPT | Mod: CPTII,S$GLB,, | Performed by: OPTOMETRIST

## 2024-07-19 PROCEDURE — 1160F RVW MEDS BY RX/DR IN RCRD: CPT | Mod: CPTII,S$GLB,, | Performed by: OPTOMETRIST

## 2024-07-19 PROCEDURE — 92015 DETERMINE REFRACTIVE STATE: CPT | Mod: S$GLB,,, | Performed by: OPTOMETRIST

## 2024-07-19 PROCEDURE — 92004 COMPRE OPH EXAM NEW PT 1/>: CPT | Mod: S$GLB,,, | Performed by: OPTOMETRIST

## 2024-07-19 PROCEDURE — 99999 PR PBB SHADOW E&M-EST. PATIENT-LVL II: CPT | Mod: PBBFAC,,, | Performed by: OPTOMETRIST

## 2024-07-19 NOTE — PROGRESS NOTES
DANGELO    ALICIA: 7/30/2019  Chief complaint (CC): 44 yo M here for annual eye exam. Pt reports mildly   blurry vision OU with current glasses. Pt requests updated glasses Rx. Pt   denies any other ocular or visual complaints today.     Glasses? + readers   Contacts? + in the past   H/o eye surgery, injections or laser: LASIK PRK ~ 2004   H/o eye injury: -  Known eye conditions? - Sudden vision loss about 5-6 years ago, has   regained vision over time; Not sure of cause saw Dr. Luna and Dr. Flynn to r/o optic neuritis   Family h/o eye conditions? -  Eye gtts? -      (-) Flashes (+)  Floaters (-) Mucous   (-)  Tearing (-) Itching (-) Burning   (-) Headaches (-) Eye Pain/discomfort (-) Irritation   (-)  Redness (-) Double vision (+) Blurry vision    Diabetic? -  A1c? -      Last edited by Philippe Schreiber, OD on 7/19/2024  9:12 AM.            Assessment /Plan     For exam results, see Encounter Report.      Eye exam, routine   - Diogo vision     Floater, vitreous, left   - No evidence of holes, tears or detachment of retina. Negative Shafers sign in vitreous. Patient educated on signs and symptoms of retinal detachment and advised to return to clinic immediately if symptoms arise.    Astigmatism of both eyes with presbyopia   - New Spectacle Rx given, discussed different options for glasses.   - RTC 1 year for routine eye exam.

## 2024-08-12 NOTE — PROGRESS NOTES
Subjective:      Patient ID: Mundo Price is a 43 y.o. male.    Chief Complaint:  No chief complaint on file.    History of Present Illness  Mundo Price is here for follow up of Hypothyroidism.  Previously seen by me 7/2023.  This is a MyChart video visit.    The patient location is: [place]  The chief complaint leading to consultation is: Hypothyroidism  Visit type: Virtual visit with synchronous audio and video  Total time spent with patient: see below  Each patient to whom he or she provides medical services by telemedicine is:  (1) informed of the relationship between the physician and patient and the respective role of any other health care provider with respect to management of the patient; and (2) notified that he or she may decline to receive medical services by telemedicine and may withdraw from such care at any time.    With regards to Hypothyroidism:    Lab Results   Component Value Date    TSH 0.76 07/19/2024    THYROIDAB 80 (H) 12/01/2021    FREET4 1.19 11/03/2020     Current Medication:  Synthroid 175mcg 1 tablet daily    Calcium/Iron: Denies     Takes thyroid medication properly without food first thing in the morning.     Denies signs or symptoms of hyper- or hypothyroidism.     Review of Systems  as above    There were no vitals taken for this visit.    There is no height or weight on file to calculate BMI.    Lab Review:   Lab Results   Component Value Date    HGBA1C 5.2 07/19/2024    HGBA1C 4.9 05/10/2022    HGBA1C 4.9 11/03/2020       Lab Results   Component Value Date    CHOL 243 (H) 01/04/2019    HDL 47 01/04/2019    LDLCALC 163.6 (H) 01/04/2019    TRIG 162 (H) 01/04/2019    CHOLHDL 19.3 (L) 01/04/2019     Lab Results   Component Value Date     07/19/2024    K 4.0 07/19/2024     07/19/2024    CO2 22 07/19/2024    GLU 78 07/19/2024    BUN 15 07/19/2024    CREATININE 0.96 07/19/2024    CALCIUM 9.2 07/19/2024    PROT 7.3 07/19/2024    ALBUMIN 4.7 07/19/2024    BILITOT 1.0  07/19/2024    ALKPHOS 64 01/01/2021    AST 22 07/19/2024    ALT 37 07/19/2024    ANIONGAP 14 01/01/2021    ESTGFRAFRICA 111 05/10/2022    EGFRNONAA 95 05/10/2022    TSH 0.76 07/19/2024     Vit D, 25-Hydroxy   Date Value Ref Range Status   11/28/2018 26 (L) 30 - 96 ng/mL Final     Comment:     Vitamin D deficiency.........<10 ng/mL                              Vitamin D insufficiency......10-29 ng/mL       Vitamin D sufficiency........> or equal to 30 ng/mL  Vitamin D toxicity............>100 ng/mL       Assessment and Plan     1. Acquired hypothyroidism  levothyroxine (SYNTHROID) 175 MCG tablet        Acquired hypothyroidism  -- Labs annually.  -- Calcium and iron need to be  from thyroid hormone replacement by 4 or > hours.  -- Please note: if you are taking biotin please hold it for 5 days prior to labs as it can interfere with the thyroid testing.  -- Medication Changes:   Synthroid 175mcg 1 tablet daily  -- Clinically and biochemically euthyroid.  -- Goal is a normal TSH.  -- Avoid exogenous hyperthyroidism as this can accelerate bone loss and increase risk of CV complications.  -- Advised to take LT4 on an empty stomach with water and to wait 30-45 minutes before eating or taking other medications   -- Reviewed usual times of thyroid hormone changes  -- Reviewed that symptoms of hypothyroidism may not correlate with tsh, and a normal TSH is the goal of therapy. Symptoms are not a justification for over treatment.      Follow up in about 1 year (around 8/15/2025).      I spent 15 minutes face-to-face with the patient, over half of the visit was spent on counseling and/or coordinating the care of the patient.    Counseling includes:  Diagnostic results, impressions, recommendations   Prognosis   Risk and benefits of management/treatment options   Instructions for management treatment and or follow-up   Importance of compliance with management   Risk factor reduction   Patient education    Visit today  included increased complexity associated with the care of the problems addressed and managing the longitudinal care of the patient due to the serious and/or complex managed problems.

## 2024-08-15 ENCOUNTER — OFFICE VISIT (OUTPATIENT)
Dept: ENDOCRINOLOGY | Facility: CLINIC | Age: 44
End: 2024-08-15
Payer: COMMERCIAL

## 2024-08-15 DIAGNOSIS — E03.9 ACQUIRED HYPOTHYROIDISM: Primary | ICD-10-CM

## 2024-08-15 PROCEDURE — 1160F RVW MEDS BY RX/DR IN RCRD: CPT | Mod: CPTII,95,, | Performed by: INTERNAL MEDICINE

## 2024-08-15 PROCEDURE — 3044F HG A1C LEVEL LT 7.0%: CPT | Mod: CPTII,95,, | Performed by: INTERNAL MEDICINE

## 2024-08-15 PROCEDURE — 1159F MED LIST DOCD IN RCRD: CPT | Mod: CPTII,95,, | Performed by: INTERNAL MEDICINE

## 2024-08-15 PROCEDURE — G2211 COMPLEX E/M VISIT ADD ON: HCPCS | Mod: 95,,, | Performed by: INTERNAL MEDICINE

## 2024-08-15 PROCEDURE — 99214 OFFICE O/P EST MOD 30 MIN: CPT | Mod: 95,,, | Performed by: INTERNAL MEDICINE

## 2024-08-15 RX ORDER — LEVOTHYROXINE SODIUM 175 UG/1
175 TABLET ORAL
Qty: 90 TABLET | Refills: 3 | Status: SHIPPED | OUTPATIENT
Start: 2024-08-15

## 2024-08-15 NOTE — ASSESSMENT & PLAN NOTE
-- Labs annually.  -- Calcium and iron need to be  from thyroid hormone replacement by 4 or > hours.  -- Please note: if you are taking biotin please hold it for 5 days prior to labs as it can interfere with the thyroid testing.  -- Medication Changes:   Synthroid 175mcg 1 tablet daily  -- Clinically and biochemically euthyroid.  -- Goal is a normal TSH.  -- Avoid exogenous hyperthyroidism as this can accelerate bone loss and increase risk of CV complications.  -- Advised to take LT4 on an empty stomach with water and to wait 30-45 minutes before eating or taking other medications   -- Reviewed usual times of thyroid hormone changes  -- Reviewed that symptoms of hypothyroidism may not correlate with tsh, and a normal TSH is the goal of therapy. Symptoms are not a justification for over treatment.

## 2024-10-16 ENCOUNTER — PATIENT MESSAGE (OUTPATIENT)
Dept: ENDOCRINOLOGY | Facility: CLINIC | Age: 44
End: 2024-10-16
Payer: COMMERCIAL

## 2024-10-16 ENCOUNTER — NURSE TRIAGE (OUTPATIENT)
Dept: ADMINISTRATIVE | Facility: CLINIC | Age: 44
End: 2024-10-16
Payer: COMMERCIAL

## 2024-10-16 NOTE — TELEPHONE ENCOUNTER
Pt called with c/o recurrent sores to mouth, usually on his tongue. Pt requesting to establish care with a PCP. Care advice recommends- home care. Appt offered and accepted.  Reason for Disposition   Probable canker sore(s)    Additional Information   Negative: Chemical in the mouth suspected cause of ulcers   Negative: [1] Drinking very little AND [2] dehydration suspected (e.g., no urine > 12 hours, very dry mouth, very lightheaded)   Negative: Generalized rash on body   Negative: Patient sounds very sick or weak to the triager   Negative: Large blisters in mouth (i.e., fluid filled bubbles or sacs)   Negative: [1] Bloody crusts on lips or sores in mouth AND [2] rash anywhere else on body (back, chest, face, palms, soles)   Negative: Gums are red, painful and have many ulcers   Negative: Fever   Negative: [1] One pimple or ulcer on the gum AND [2] near a toothache   Negative: Large lymph node (> 1 inch or 2.5 cm) under the jaw   Negative: Facial swelling   Negative: Weak immune system (e.g., HIV positive, cancer chemo, splenectomy, organ transplant, chronic steroids)   Negative: 4 or more ulcers   Negative: Mouth ulcer lasts > 2 weeks   Negative: Painless ulcer or sore   Negative: Longstanding or recurrent problems with arthritis, eye pain or genital sores    Protocols used: Mouth Ulcers-A-

## 2024-10-18 ENCOUNTER — OFFICE VISIT (OUTPATIENT)
Dept: INTERNAL MEDICINE | Facility: CLINIC | Age: 44
End: 2024-10-18
Payer: COMMERCIAL

## 2024-10-18 VITALS
BODY MASS INDEX: 30.4 KG/M2 | DIASTOLIC BLOOD PRESSURE: 87 MMHG | HEIGHT: 72 IN | OXYGEN SATURATION: 97 % | SYSTOLIC BLOOD PRESSURE: 128 MMHG | HEART RATE: 75 BPM | WEIGHT: 224.44 LBS

## 2024-10-18 DIAGNOSIS — K12.1 MOUTH ULCERS: Primary | ICD-10-CM

## 2024-10-18 PROCEDURE — 99999 PR PBB SHADOW E&M-EST. PATIENT-LVL III: CPT | Mod: PBBFAC,,,

## 2024-10-18 NOTE — PROGRESS NOTES
[facility]  CLINIC NOTE    Patient Name: Mundo Price  YOB: 1980    PRESENTING HISTORY       History of Present Illness:  Mr. Mundo Price is a 43 y.o. male with a past medical history as listed below presents to clinic complaining of mouth ulcers.  He states that this has been a chronic problem for him and that he has had them for as long as he can remember.  However, they use to appear mainly on the gingiva.  Now they are occurring on his tongue.  He never has more than 1 of these ulcers at a time.  They are triggered by some foods such as chocolate and tomatoes as well as trauma to the mouth.  He states that he has been avoiding the aforementioned foods for at least 2 years.  He states that they are painful and they usually resolve within 2 weeks.  In the past, he has used saltwater rinses and hydrogen peroxide both of which have provided him some relief.  He denies the presence of any other skin lesions, fever, recent sick contacts, smokeless tobacco use, and recent trauma.    Review of Systems   Constitutional:  Negative for chills, fever and weight loss.   HENT:  Negative for sinus pain and sore throat.         Mouth ulcers   Respiratory:  Negative for cough and shortness of breath.    Cardiovascular:  Negative for chest pain and palpitations.   Gastrointestinal:  Negative for abdominal pain, diarrhea, heartburn, nausea and vomiting.   Genitourinary:  Negative for dysuria and urgency.       PAST HISTORY:     Past Medical History:   Diagnosis Date    Known health problems: none     Optic neuritis     Optic neuritis     Thyroid disease        Past Surgical History:   Procedure Laterality Date    CLAVICLE SURGERY Right 2014    has plate/hardware    HERNIA REPAIR Right 1992    inguinal     REFRACTIVE SURGERY Bilateral     PRK 2003 OU     TONSILLECTOMY         Family History   Problem Relation Name Age of Onset    Hypothyroidism Father      Hypothyroidism Sister       No Known Problems Mother      Heart attack Maternal Grandfather      Dementia Paternal Grandmother      Cancer Maternal Aunt          unknown type       Social History     Socioeconomic History    Marital status:     Number of children: 0   Occupational History     Comment:  in    Tobacco Use    Smoking status: Former     Current packs/day: 0.00     Types: Cigarettes     Start date: 2004     Quit date: 2016     Years since quittin.9    Smokeless tobacco: Never    Tobacco comments:     would smoke socially   Substance and Sexual Activity    Alcohol use: Yes     Comment: social    Drug use: No    Sexual activity: Yes     Partners: Female     Birth control/protection: None     Comment: wife     Social Drivers of Health     Financial Resource Strain: Low Risk  (2024)    Overall Financial Resource Strain (CARDIA)     Difficulty of Paying Living Expenses: Not hard at all   Food Insecurity: No Food Insecurity (2024)    Hunger Vital Sign     Worried About Running Out of Food in the Last Year: Never true     Ran Out of Food in the Last Year: Never true   Transportation Needs: No Transportation Needs (2023)    PRAPARE - Transportation     Lack of Transportation (Medical): No     Lack of Transportation (Non-Medical): No   Physical Activity: Insufficiently Active (2024)    Exercise Vital Sign     Days of Exercise per Week: 4 days     Minutes of Exercise per Session: 20 min   Stress: No Stress Concern Present (2024)    [employer] [place] - Occupational Stress Questionnaire     Feeling of Stress : Not at all   Housing Stability: Unknown (2023)    Housing Stability Vital Sign     Unable to Pay for Housing in the Last Year: No     Unstable Housing in the Last Year: No       MEDICATIONS & ALLERGIES:     Current Outpatient Medications on File Prior to Visit   Medication Sig    levothyroxine (SYNTHROID) 175 MCG tablet Take 1 tablet (175 mcg  total) by mouth before breakfast.     No current facility-administered medications on file prior to visit.       Review of patient's allergies indicates:   Allergen Reactions    Ciprofloxacin      Fatigued, GI upset       OBJECTIVE:   Vital Signs:  Vitals:    10/18/24 1522   BP: 128/87   Pulse: 75   SpO2: 97%   Weight: 101.8 kg (224 lb 6.9 oz)   Height: 6' (1.829 m)        Physical Exam  Vitals and nursing note reviewed.   Constitutional:       General: He is not in acute distress.     Appearance: He is not ill-appearing, toxic-appearing or diaphoretic.   HENT:      Head: Normocephalic and atraumatic.      Mouth/Throat:      Mouth: Mucous membranes are moist.   Eyes:      General: No scleral icterus.     Extraocular Movements: Extraocular movements intact.   Cardiovascular:      Rate and Rhythm: Normal rate and regular rhythm.      Pulses: Normal pulses.      Heart sounds: Normal heart sounds. No murmur heard.     No friction rub. No gallop.   Pulmonary:      Effort: Pulmonary effort is normal. No respiratory distress.      Breath sounds: Normal breath sounds. No stridor. No wheezing, rhonchi or rales.   Chest:      Chest wall: No tenderness.   Abdominal:      General: Abdomen is flat. Bowel sounds are normal.      Palpations: Abdomen is soft.   Musculoskeletal:      Right lower leg: No edema.      Left lower leg: No edema.   Skin:     General: Skin is warm and dry.   Neurological:      General: No focal deficit present.      Mental Status: He is alert and oriented to person, place, and time.       ASSESSMENT & PLAN:     Mundo was seen today for mouth lesions.  These mouth lesions have been a chronic problem for him.  He received much of what I have considered to be a workup for mouth lesions previously in the emergency room when he had a transient loss of vision in 2018.  At that time his workup was negative for HIV, hepatitis-C virus, Sjogren's and other autoimmune conditions.  I considered the possibility that  this could be Bechet's disease; however, he has never had any issues with genital ulcers, or any other cutaneous lesions.  As he has had an issue with certain foods in the past causing this as well as some ingredients in toothpaste we will consult allergy/immunology for further workup.    Diagnoses and all orders for this visit:    Mouth ulcers  Comments:  - Contact Dr. Hermosillo if unable to get magic mouth wash.  - Biotene mouthwash  Orders:  -     (Magic mouthwash) 1:1:1 diphenhydrAMINE(Benadryl) 12.5mg/5ml liq, aluminum & magnesium hydroxide-simethicone (Maalox), LIDOcaine viscous 2%; Swish and spit 15 mLs every 4 (four) hours as needed. for mouth sores  -     Ambulatory referral/consult to Allergy; Future        Health Maintenance         Date Due Completion Date    TETANUS VACCINE Never done ---    Influenza Vaccine (1) Never done ---    COVID-19 Vaccine (4 - 2024-25 season) 09/01/2024 12/16/2021    Lipid Panel 09/11/2025 9/11/2020    Override on 5/1/2018: Done    Hemoglobin A1c (Diabetic Prevention Screening) 07/19/2027 7/19/2024    RSV Vaccine (Age 60+ and Pregnant patients) (1 - 1-dose 75+ series) 11/15/2055 ---            Discussed with Dr. Salguero    RTC in one year and as needed      Andrew Hermosillo. MD  Internal Medicine PGY-1  [facility]  [address] [place] [address]

## 2024-10-28 ENCOUNTER — OFFICE VISIT (OUTPATIENT)
Dept: ALLERGY | Facility: CLINIC | Age: 44
End: 2024-10-28
Payer: COMMERCIAL

## 2024-10-28 VITALS — BODY MASS INDEX: 30.34 KG/M2 | HEIGHT: 72 IN | WEIGHT: 224 LBS

## 2024-10-28 DIAGNOSIS — J30.2 SEASONAL ALLERGIC RHINITIS, UNSPECIFIED TRIGGER: ICD-10-CM

## 2024-10-28 DIAGNOSIS — H10.13 ALLERGIC CONJUNCTIVITIS OF BOTH EYES: ICD-10-CM

## 2024-10-28 DIAGNOSIS — K12.1 MOUTH ULCERS: Primary | ICD-10-CM

## 2024-10-28 PROCEDURE — 3044F HG A1C LEVEL LT 7.0%: CPT | Mod: CPTII,S$GLB,, | Performed by: ALLERGY & IMMUNOLOGY

## 2024-10-28 PROCEDURE — 3008F BODY MASS INDEX DOCD: CPT | Mod: CPTII,S$GLB,, | Performed by: ALLERGY & IMMUNOLOGY

## 2024-10-28 PROCEDURE — 99999 PR PBB SHADOW E&M-EST. PATIENT-LVL III: CPT | Mod: PBBFAC,,, | Performed by: ALLERGY & IMMUNOLOGY

## 2024-10-28 PROCEDURE — 1159F MED LIST DOCD IN RCRD: CPT | Mod: CPTII,S$GLB,, | Performed by: ALLERGY & IMMUNOLOGY

## 2024-10-28 PROCEDURE — 99204 OFFICE O/P NEW MOD 45 MIN: CPT | Mod: S$GLB,,, | Performed by: ALLERGY & IMMUNOLOGY

## 2024-10-28 RX ORDER — AZELASTINE HYDROCHLORIDE 0.5 MG/ML
1 SOLUTION/ DROPS OPHTHALMIC 2 TIMES DAILY
Qty: 6 ML | Refills: 6 | Status: SHIPPED | OUTPATIENT
Start: 2024-10-28 | End: 2024-11-27

## 2024-10-28 RX ORDER — FLUTICASONE PROPIONATE 50 MCG
2 SPRAY, SUSPENSION (ML) NASAL DAILY
Qty: 16 G | Refills: 11 | Status: SHIPPED | OUTPATIENT
Start: 2024-10-28

## 2024-11-01 ENCOUNTER — PATIENT MESSAGE (OUTPATIENT)
Dept: OPTOMETRY | Facility: CLINIC | Age: 44
End: 2024-11-01
Payer: COMMERCIAL

## 2024-11-01 ENCOUNTER — TELEPHONE (OUTPATIENT)
Dept: OPTOMETRY | Facility: CLINIC | Age: 44
End: 2024-11-01
Payer: COMMERCIAL

## 2025-01-27 ENCOUNTER — PATIENT MESSAGE (OUTPATIENT)
Dept: ENDOCRINOLOGY | Facility: CLINIC | Age: 45
End: 2025-01-27
Payer: COMMERCIAL

## 2025-01-27 DIAGNOSIS — E03.9 ACQUIRED HYPOTHYROIDISM: Primary | ICD-10-CM

## 2025-02-12 ENCOUNTER — HOSPITAL ENCOUNTER (EMERGENCY)
Facility: OTHER | Age: 45
Discharge: HOME | End: 2025-02-12
Attending: EMERGENCY MEDICINE
Payer: COMMERCIAL

## 2025-02-12 VITALS
DIASTOLIC BLOOD PRESSURE: 78 MMHG | RESPIRATION RATE: 18 BRPM | OXYGEN SATURATION: 99 % | HEIGHT: 72 IN | HEART RATE: 68 BPM | SYSTOLIC BLOOD PRESSURE: 136 MMHG | TEMPERATURE: 99 F | WEIGHT: 215 LBS | BODY MASS INDEX: 29.12 KG/M2

## 2025-02-12 DIAGNOSIS — S30.822A BLISTER (NONTHERMAL) OF PENIS, INITIAL ENCOUNTER: Primary | ICD-10-CM

## 2025-02-12 DIAGNOSIS — R21 RASH AND NONSPECIFIC SKIN ERUPTION: ICD-10-CM

## 2025-02-12 DIAGNOSIS — N50.811 RIGHT TESTICULAR PAIN: ICD-10-CM

## 2025-02-12 LAB
BILIRUB UR QL STRIP: NEGATIVE
CLARITY UR: CLEAR
COLOR UR: YELLOW
GLUCOSE UR QL STRIP: NEGATIVE
HCV AB SERPL QL IA: NEGATIVE
HGB UR QL STRIP: NEGATIVE
HIV 1+2 AB+HIV1 P24 AG SERPL QL IA: NEGATIVE
KETONES UR QL STRIP: NEGATIVE
LEUKOCYTE ESTERASE UR QL STRIP: NEGATIVE
NITRITE UR QL STRIP: NEGATIVE
PH UR STRIP: 6 [PH] (ref 5–8)
PROT UR QL STRIP: NEGATIVE
SP GR UR STRIP: 1.01 (ref 1–1.03)
TREPONEMA PALLIDUM IGG+IGM AB [PRESENCE] IN SERUM OR PLASMA BY IMMUNOASSAY: NONREACTIVE
URN SPEC COLLECT METH UR: NORMAL
UROBILINOGEN UR STRIP-ACNC: NEGATIVE EU/DL

## 2025-02-12 PROCEDURE — 86803 HEPATITIS C AB TEST: CPT | Performed by: EMERGENCY MEDICINE

## 2025-02-12 PROCEDURE — 87591 N.GONORRHOEAE DNA AMP PROB: CPT | Performed by: PHYSICIAN ASSISTANT

## 2025-02-12 PROCEDURE — 99284 EMERGENCY DEPT VISIT MOD MDM: CPT | Mod: 25

## 2025-02-12 PROCEDURE — 81003 URINALYSIS AUTO W/O SCOPE: CPT | Performed by: NURSE PRACTITIONER

## 2025-02-12 PROCEDURE — 87529 HSV DNA AMP PROBE: CPT | Mod: 59

## 2025-02-12 PROCEDURE — 87529 HSV DNA AMP PROBE: CPT | Mod: 59 | Performed by: NURSE PRACTITIONER

## 2025-02-12 PROCEDURE — 86593 SYPHILIS TEST NON-TREP QUANT: CPT | Performed by: EMERGENCY MEDICINE

## 2025-02-12 PROCEDURE — 87389 HIV-1 AG W/HIV-1&-2 AB AG IA: CPT | Performed by: EMERGENCY MEDICINE

## 2025-02-12 RX ORDER — ACETAMINOPHEN 500 MG
1000 TABLET ORAL EVERY 6 HOURS PRN
Qty: 30 TABLET | Refills: 0 | Status: SHIPPED | OUTPATIENT
Start: 2025-02-12 | End: 2025-02-12 | Stop reason: ALTCHOICE

## 2025-02-12 RX ORDER — VALACYCLOVIR HYDROCHLORIDE 500 MG/1
1000 TABLET, FILM COATED ORAL 2 TIMES DAILY
Qty: 40 TABLET | Refills: 0 | Status: SHIPPED | OUTPATIENT
Start: 2025-02-12 | End: 2025-02-12

## 2025-02-12 RX ORDER — VALACYCLOVIR HYDROCHLORIDE 500 MG/1
1000 TABLET, FILM COATED ORAL 2 TIMES DAILY
Qty: 40 TABLET | Refills: 0 | Status: SHIPPED | OUTPATIENT
Start: 2025-02-12 | End: 2025-02-22

## 2025-02-12 RX ORDER — NAPROXEN 500 MG/1
500 TABLET ORAL 2 TIMES DAILY PRN
Qty: 14 TABLET | Refills: 0 | Status: SHIPPED | OUTPATIENT
Start: 2025-02-12 | End: 2025-02-19

## 2025-02-12 RX ORDER — ACETAMINOPHEN 500 MG
1000 TABLET ORAL EVERY 6 HOURS PRN
Qty: 30 TABLET | Refills: 0 | Status: SHIPPED | OUTPATIENT
Start: 2025-02-12 | End: 2025-02-12

## 2025-02-12 NOTE — FIRST PROVIDER EVALUATION
Emergency Department TeleTriage Encounter Note      CHIEF COMPLAINT    Chief Complaint   Patient presents with    Dysuria    Testicle Pain     Blisters noticed to penis today, other symptoms started over the last month.        VITAL SIGNS   Initial Vitals [02/12/25 1753]   BP Pulse Resp Temp SpO2   (!) 145/85 86 18 98.7 °F (37.1 °C) 98 %      MAP       --            ALLERGIES    Review of patient's allergies indicates:   Allergen Reactions    Ciprofloxacin      Fatigued, GI upset       PROVIDER TRIAGE NOTE  Patient presents  with testicular pain x3 days. He had similar pain earlier in the month. Also reports rash to the shin.       ORDERS  Labs Reviewed   HEPATITIS C ANTIBODY   HIV 1 / 2 ANTIBODY       ED Orders (720h ago, onward)      Start Ordered     Status Ordering Provider    02/12/25 1755 02/12/25 1754  Hepatitis C Antibody  STAT         Ordered MARCIA LOUIS    02/12/25 1755 02/12/25 1754  HIV 1/2 Ag/Ab (4th Gen)  STAT         Ordered MARCIA LOUIS              Virtual Visit Note: The provider triage portion of this emergency department evaluation and documentation was performed via [employer], a HIPAA-compliant telemedicine application, in concert with a tele-presenter in the room. A face to face patient evaluation with one of my colleagues will occur once the patient is placed in an emergency department room.      DISCLAIMER: This note was prepared with [employer] voice recognition transcription software. Garbled syntax, mangled pronouns, and other bizarre constructions may be attributed to that software system.

## 2025-02-13 NOTE — DISCHARGE INSTRUCTIONS
Mundo Price    A referral for Dermatology has been placed. We will contact you if there are any abnormal lab results.     For access to your medical records and a summary of today's visit, please use the [facility] mary kate.  We look forward to serving all your future healthcare needs at [facility].    Warm regards,  Raoul Gonzalez PA-C

## 2025-02-13 NOTE — ED PROVIDER NOTES
Encounter Date: 2/12/2025       History     Chief Complaint   Patient presents with    Dysuria    Testicle Pain     Blisters noticed to penis today, other symptoms started over the last month.      A 44-year-old male with a history of right hernia repair(1992) and hypothyroidism comes in for for right testicular pain and a bump on the right side of his groin area.  He reports that the testicular pain first started 6 weeks ago when he was having a bowel movement and all of a sudden pain shoots from his rectum to his right testicular.  States that the pain went away shortly after but came back 3 days. The pain has been gradually improving since then. Endorses intermittent pain in his right testicle when he was trying to urinate. Today, he noticed tiny painless fluid-filled bumps on the dorsal shaft of his penis. Pt also noticed a painful bump on the right side of his groan area. Endorses urinary frequency.  No urgency, abnormal discharge, or urinary incontinence.  Denies any fever or abdominal pain.  States that he only has intercourse with 1 woman for the past 2 years.  No history of prostate cancer.  He also has a itchy rash on his right shin that intermittently flare up for 2 weeks at a time for the past 5 years.         Review of patient's allergies indicates:   Allergen Reactions    Ciprofloxacin      Fatigued, GI upset     Past Medical History:   Diagnosis Date    Known health problems: none     Optic neuritis     Optic neuritis     Thyroid disease      Past Surgical History:   Procedure Laterality Date    ADENOIDECTOMY      CLAVICLE SURGERY Right 2014    has plate/hardware    HERNIA REPAIR Right 1992    inguinal     REFRACTIVE SURGERY Bilateral     PRK 2003 OU     TONSILLECTOMY       Family History   Problem Relation Name Age of Onset    Hypothyroidism Father      Hypothyroidism Sister      No Known Problems Mother      Heart attack Maternal Grandfather      Dementia Paternal Grandmother      Cancer Maternal  Aunt          unknown type     Social History     Tobacco Use    Smoking status: Former     Current packs/day: 0.00     Types: Cigarettes     Start date: 2004     Quit date: 2016     Years since quittin.2    Smokeless tobacco: Never    Tobacco comments:     would smoke socially   Substance Use Topics    Alcohol use: Yes     Comment: social    Drug use: No     Review of Systems   Constitutional:  Negative for chills, fatigue and fever.   Respiratory:  Negative for shortness of breath.    Cardiovascular:  Negative for chest pain and leg swelling.   Genitourinary:  Positive for difficulty urinating, frequency, penile pain and testicular pain. Negative for dysuria, hematuria, penile discharge and urgency.   Musculoskeletal:  Negative for joint swelling.   Skin:  Positive for rash.   Neurological:  Negative for dizziness, weakness and light-headedness.       Physical Exam     Initial Vitals [25 1753]   BP Pulse Resp Temp SpO2   (!) 145/85 86 18 98.7 °F (37.1 °C) 98 %      MAP       --         Physical Exam    Constitutional: He appears well-developed and well-nourished.   HENT:   Head: Normocephalic and atraumatic.   Pulmonary/Chest: Breath sounds normal.   Abdominal: Abdomen is soft. Hernia confirmed positive in the right inguinal area.   Genitourinary: Right testis shows swelling. Right testis shows no mass and no tenderness. Right testis is descended. Cremasteric reflex is not absent on the right side. Left testis shows no mass and no tenderness. Left testis is descended. Cremasteric reflex is not absent on the left side. No discharge found.    Genitourinary Comments: Right testicle appears slightly larger than left.  No mass or tenderness to the scrotum.  No erythema.           Neurological: He is alert and oriented to person, place, and time.   Skin: Skin is warm. Capillary refill takes less than 2 seconds. Rash noted. There is erythema.              ED Course   Procedures  Labs Reviewed    HEPATITIS C ANTIBODY       Result Value    Hepatitis C Ab Negative      Narrative:     Release to patient->Immediate   HIV 1 / 2 ANTIBODY    HIV 1/2 Ag/Ab Negative      Narrative:     Release to patient->Immediate   URINALYSIS, REFLEX TO URINE CULTURE    Specimen UA Urine, Clean Catch      Color, UA Yellow      Appearance, UA Clear      pH, UA 6.0      Specific Gravity, UA 1.015      Protein, UA Negative      Glucose, UA Negative      Ketones, UA Negative      Bilirubin (UA) Negative      Occult Blood UA Negative      Nitrite, UA Negative      Urobilinogen, UA Negative      Leukocytes, UA Negative      Narrative:     Specimen Source->Urine   HERPES SIMPLEX (HSV) BY RAPID PCR, NON-BLOOD    HSV PCR Source GENITAL      Narrative:     Sources by Resulting Lab:->[facility]  Specimen Source:->Genital  Release to patient->Immediate   TREPONEMA PALLIDUM (SYPHILIS) ANTIBODY   TREPONEMA PALLIDIUM ANTIBODIES IGG, IGM    Treponema Pallidum Antibodies (IgG, IgM) Nonreactive      Narrative:     Release to patient->Immediate   C. TRACHOMATIS/N. GONORRHOEAE BY AMP DNA   HERPES SIMPLEX VIRUS (HSV) TYPE 1 & 2 DNA BY PCR          Imaging Results              US Scrotum And Testicles (In process)                      Medications - No data to display  Medical Decision Making  A 44-year-old male with history of inguinal hernia and hypothyroidism comes in for right-sided testicular pain that occurred 6 weeks ago, went away, and recently came back 3 days ago.  Pain worsens with urination.  Also came in for a painful bump in his right groin area that also occurred 3 days ago.  Today he noticed several painless fluid-filled vesicles on the dorsal shaft of his penis. Denies any fever or abnormal discharge.  Has only 1 sexual female partner in the past 2 years.  Also complains of a itchy right shin rash that comes and goes for a few weeks at a time for the past 5 years.  No redness or swelling.    Ddx Including but not limited to epididymitis,  testicular torsion, orchitis, inguinal hernia, hydrocele, varicocele, trauma, testicular tumor, shingles, inflamed lymph node.     Ed course: Hep C antibody negative, HIV negative, urinalysis clean, treponema pallidum nonreactive.  Chlamydia/gonorrhea test pending.  HSV tests pending.    Patient would like to hold off on any treatment for gonorrhea/chlamydia into his results comes back.  He wants to proceed with the antiviral. I have put in a referral for patient to see dermatology for his shin rash.  Will put patient on an antiviral into his herpes test comes back.  Patient is stable and can be discharged.     Risk  OTC drugs.  Prescription drug management.               ED Course as of 02/12/25 2306 Wed Feb 12, 2025 2259 I personally made/approved the management plan for this patient and take responsibility for the patient management. I had face-to-face time with the patient.  Patient declined repeat  exam.  He stated he was unwilling to wait for ultrasound results.   I independently interpreted the patient's ultrasound, notable for no evidence of torsion.  --  I discussed with pt and/or guardian/caretaker that this evaluation in the ED does not suggest any emergent or life threatening medical condition requiring admission or further immediate intervention or diagnostics. Regardless, an unremarkable evaluation in the ED does not preclude the development or presence of a serious or life threatening condition. Pt was instructed to return for any worsening, new, changed, or concerning symptoms.   Clinically, patient likely has herpes outbreak, however other STD is also on the differential.  He has no large obvious inguinal hernia; I doubt incarcerated or strangulated hernia.   I had a detailed discussion with patient and/or guardian/caretaker regarding findings, plan, return precautions, importance of medication adherence, need to follow-up with a PCP and specialist. All questions answered.     Note was created  using voice recognition software. It may have occasional typographical errors not identified and edited despite initial review prior to signing.         [RC]      ED Course User Index  [RC] iFdel Sabillon MD                           Clinical Impression:  Final diagnoses:  [N50.811] Right testicular pain  [S30.822A] Blister (nonthermal) of penis, initial encounter (Primary)  [R21] Rash and nonspecific skin eruption          ED Disposition Condition    Discharge Good          ED Prescriptions       Medication Sig Dispense Start Date End Date Auth. Provider    valACYclovir (VALTREX) 500 MG tablet  (Status: Discontinued) Take 2 tablets (1,000 mg total) by mouth 2 (two) times daily. for 10 days 40 tablet 2/12/2025 2/12/2025 Raoul Gonzalez PA-C    acetaminophen (TYLENOL) 500 MG tablet  (Status: Discontinued) Take 2 tablets (1,000 mg total) by mouth every 6 (six) hours as needed for Pain. 30 tablet 2/12/2025 2/12/2025 Raoul Gonzalez PA-C    acetaminophen (TYLENOL) 500 MG tablet  (Status: Discontinued) Take 2 tablets (1,000 mg total) by mouth every 6 (six) hours as needed for Pain. 30 tablet 2/12/2025 2/12/2025 Raoul Gonzalez PA-C    valACYclovir (VALTREX) 500 MG tablet  (Status: Discontinued) Take 2 tablets (1,000 mg total) by mouth 2 (two) times daily. for 10 days 40 tablet 2/12/2025 2/12/2025 Raoul Gonzalez PA-C    naproxen (NAPROSYN) 500 MG tablet Take 1 tablet (500 mg total) by mouth 2 (two) times daily as needed (pain). 14 tablet 2/12/2025 2/19/2025 Fidel Sabillon MD    valACYclovir (VALTREX) 500 MG tablet Take 2 tablets (1,000 mg total) by mouth 2 (two) times daily. for 10 days 40 tablet 2/12/2025 2/22/2025 Fidel Sabillon MD          Follow-up Information       Follow up With Specialties Details Why Contact Info Additional Information    Wei Hermosillo MD Internal Medicine Schedule an appointment as soon as possible for a visit  For recheck with your primary care doctor Rohit Meeks  [address]  [facility] [address]  [phone]       [facility] Urology Schedule an appointment as soon as possible for a visit  For recheck with specialist, if your symptoms continue [address], [address]  [facility] [address] [phone] Urology - [facility], 6th Floor, [address]. Please park in the [employer] [address]. Please come with a full bladder to in office visit to provide a urine specimen when you arrive.             Raoul Gonzalez PA-C  02/12/25 2279       Fidel Sabillon MD  02/12/25 7529

## 2025-02-13 NOTE — ED TRIAGE NOTES
Pt reports for the past month he noticed pain when urinating and pain in his right testicle. The s/s resolved and then came back two days ago. He denies any testicular swelling.

## 2025-02-15 LAB
C TRACH DNA SPEC QL NAA+PROBE: NOT DETECTED
HSV-1 DNA BY PCR: NEGATIVE
HSV-2 DNA BY PCR: NEGATIVE
HSV1 DNA SPEC QL NAA+PROBE: POSITIVE
HSV2 DNA SPEC QL NAA+PROBE: NEGATIVE
N GONORRHOEA DNA SPEC QL NAA+PROBE: NOT DETECTED
SPECIMEN SOURCE: ABNORMAL

## 2025-04-17 NOTE — ASSESSMENT & PLAN NOTE
38 year old male with odynophagia. Flexible laryngoscopy is normal. We discussed that his symptoms could be related to throat irritation from post nasal drip. I have prescribed Flonase. He will try this and let me know if his symptoms worsen or do not improve. Questions answered.   Patient Instructions after a Colonoscopy      The anesthetics, sedatives or narcotics which were given to you today will be acting in your body for the next 24 hours, so you might feel a little sleepy or groggy.  This feeling should slowly wear off. Carefully read and follow the instructions.     You received sedation today:  - Do not drive or operate any machinery or power tools of any kind.   - No alcoholic beverages today, not even beer or wine.  - Do not make any important decisions or sign any legal documents.  - No over the counter medications that contain alcohol or that may cause drowsiness.  - Do not make any important decisions or sign any legal documents.  - Make sure you have someone with you for first 24 hours.    While it is common to experience mild to moderate abdominal distention, gas, or belching after your procedure, if any of these symptoms occur following discharge from the GI Lab or within one week of having your procedure, call the [facility] to be advised whether a visit to your nearest Urgent Care or Emergency Department is indicated.  Take this paper with you if you go.     - If you develop an allergic reaction to the medications that were given during your procedure such as difficulty breathing, rash, hives, severe nausea, vomiting or lightheadedness.  - If you experience chest pain, shortness of breath, severe abdominal pain, fevers and chills.  -If you develop signs and symptoms of bleeding such as blood in your spit, if your stools turn black, tarry, or bloody  - If you have not urinated within 8 hours following your procedure.  - If your IV site becomes painful, red, inflamed, or looks infected.    If you received a biopsy/polypectomy/sphincterotomy the following instructions apply below:    __ Do not use Aspirin containing products, non-steroidal medications or anti-coagulants for one week following your procedure. (Examples of these types of medications are: Advil,  Arthrotec, Aleve, Coumadin, Ecotrin, Heparin, Ibuprofen, Indocin, Motrin, Naprosyn, Nuprin, Plavix, Vioxx, and Voltarin, or their generic forms.  This list is not all-inclusive.  Check with your physician or pharmacist before resuming medications.)   __ Eat a soft diet today.  Avoid foods that are poorly digested for the next 24 hours.  These foods would include: nuts, beans, lettuce, red meats, and fried foods. Start with liquids and advance your diet as tolerated, gradually work up to eating solids.   __ Do not have a Barium Study or Enema for one week.    Your physician recommends the additional following instructions:    -You have a contact number available for emergencies. The signs and symptoms of potential delayed complications were discussed with you. You may return to normal activities tomorrow.  -Resume your previous diet.  -Continue your present medications.   -We are waiting for your pathology results.  -Your physician has recommended a repeat colonoscopy (date to be determined after pending pathology results are reviewed) for surveillance based on pathology results.  -The findings and recommendations have been discussed with you.  -The findings and recommendations were discussed with your family.  - Please see Medication Reconciliation Form for new medication/medications prescribed.

## 2025-05-28 ENCOUNTER — TELEPHONE (OUTPATIENT)
Dept: ENDOCRINOLOGY | Facility: CLINIC | Age: 45
End: 2025-05-28
Payer: COMMERCIAL

## 2025-05-28 DIAGNOSIS — E03.9 ACQUIRED HYPOTHYROIDISM: ICD-10-CM

## 2025-05-28 RX ORDER — LEVOTHYROXINE SODIUM 175 UG/1
175 TABLET ORAL
Qty: 90 TABLET | Refills: 0 | Status: SHIPPED | OUTPATIENT
Start: 2025-05-28

## 2025-06-16 ENCOUNTER — PATIENT MESSAGE (OUTPATIENT)
Dept: ENDOCRINOLOGY | Facility: CLINIC | Age: 45
End: 2025-06-16
Payer: COMMERCIAL

## 2025-06-21 ENCOUNTER — HOSPITAL ENCOUNTER (EMERGENCY)
Facility: HOSPITAL | Age: 45
Discharge: HOME | End: 2025-06-21
Attending: EMERGENCY MEDICINE
Payer: COMMERCIAL

## 2025-06-21 VITALS
DIASTOLIC BLOOD PRESSURE: 77 MMHG | BODY MASS INDEX: 29.12 KG/M2 | HEIGHT: 72 IN | RESPIRATION RATE: 16 BRPM | SYSTOLIC BLOOD PRESSURE: 131 MMHG | WEIGHT: 215 LBS | HEART RATE: 69 BPM | TEMPERATURE: 98 F | OXYGEN SATURATION: 96 %

## 2025-06-21 DIAGNOSIS — H53.9 VISION CHANGES: Primary | ICD-10-CM

## 2025-06-21 LAB
ABSOLUTE EOSINOPHIL (OHS): 0.19 K/UL
ABSOLUTE MONOCYTE (OHS): 0.66 K/UL (ref 0.3–1)
ABSOLUTE NEUTROPHIL COUNT (OHS): 4.66 K/UL (ref 1.8–7.7)
ALBUMIN SERPL BCP-MCNC: 4.9 G/DL (ref 3.5–5.2)
ALP SERPL-CCNC: 59 UNIT/L (ref 40–150)
ALT SERPL W/O P-5'-P-CCNC: 42 UNIT/L (ref 10–44)
ANION GAP (OHS): 11 MMOL/L (ref 8–16)
AST SERPL-CCNC: 25 UNIT/L (ref 11–45)
BASOPHILS # BLD AUTO: 0.13 K/UL
BASOPHILS NFR BLD AUTO: 1.6 %
BILIRUB SERPL-MCNC: 1.6 MG/DL (ref 0.1–1)
BUN SERPL-MCNC: 12 MG/DL (ref 6–20)
CALCIUM SERPL-MCNC: 9.4 MG/DL (ref 8.7–10.5)
CHLORIDE SERPL-SCNC: 107 MMOL/L (ref 95–110)
CO2 SERPL-SCNC: 23 MMOL/L (ref 23–29)
CREAT SERPL-MCNC: 0.8 MG/DL (ref 0.5–1.4)
ERYTHROCYTE [DISTWIDTH] IN BLOOD BY AUTOMATED COUNT: 11.7 % (ref 11.5–14.5)
GFR SERPLBLD CREATININE-BSD FMLA CKD-EPI: >60 ML/MIN/1.73/M2
GLUCOSE SERPL-MCNC: 89 MG/DL (ref 70–110)
HCT VFR BLD AUTO: 52.7 % (ref 40–54)
HGB BLD-MCNC: 17.8 GM/DL (ref 14–18)
IMM GRANULOCYTES # BLD AUTO: 0.13 K/UL (ref 0–0.04)
IMM GRANULOCYTES NFR BLD AUTO: 1.6 % (ref 0–0.5)
LYMPHOCYTES # BLD AUTO: 2.15 K/UL (ref 1–4.8)
MCH RBC QN AUTO: 31.3 PG (ref 27–31)
MCHC RBC AUTO-ENTMCNC: 33.8 G/DL (ref 32–36)
MCV RBC AUTO: 93 FL (ref 82–98)
NUCLEATED RBC (/100WBC) (OHS): 0 /100 WBC
PLATELET # BLD AUTO: 210 K/UL (ref 150–450)
PMV BLD AUTO: 9.1 FL (ref 9.2–12.9)
POTASSIUM SERPL-SCNC: 4.1 MMOL/L (ref 3.5–5.1)
PROT SERPL-MCNC: 7.8 GM/DL (ref 6–8.4)
RBC # BLD AUTO: 5.68 M/UL (ref 4.6–6.2)
RELATIVE EOSINOPHIL (OHS): 2.4 %
RELATIVE LYMPHOCYTE (OHS): 27.1 % (ref 18–48)
RELATIVE MONOCYTE (OHS): 8.3 % (ref 4–15)
RELATIVE NEUTROPHIL (OHS): 59 % (ref 38–73)
SODIUM SERPL-SCNC: 141 MMOL/L (ref 136–145)
THYROPEROXIDASE IGG SERPL-ACNC: 100.2 IU/ML
TSH SERPL-ACNC: 0.79 UIU/ML (ref 0.4–4)
WBC # BLD AUTO: 7.92 K/UL (ref 3.9–12.7)

## 2025-06-21 PROCEDURE — 84443 ASSAY THYROID STIM HORMONE: CPT

## 2025-06-21 PROCEDURE — 85025 COMPLETE CBC W/AUTO DIFF WBC: CPT

## 2025-06-21 PROCEDURE — 86376 MICROSOMAL ANTIBODY EACH: CPT

## 2025-06-21 PROCEDURE — 99284 EMERGENCY DEPT VISIT MOD MDM: CPT | Mod: 25

## 2025-06-21 PROCEDURE — 84445 ASSAY OF TSI GLOBULIN: CPT

## 2025-06-21 PROCEDURE — 25000003 PHARM REV CODE 250

## 2025-06-21 PROCEDURE — 80053 COMPREHEN METABOLIC PANEL: CPT

## 2025-06-21 RX ORDER — GADOBUTROL 604.72 MG/ML
10 INJECTION INTRAVENOUS
Status: DISCONTINUED | OUTPATIENT
Start: 2025-06-21 | End: 2025-06-21 | Stop reason: HOSPADM

## 2025-06-21 RX ORDER — PROPARACAINE HYDROCHLORIDE 5 MG/ML
1 SOLUTION/ DROPS OPHTHALMIC
Status: COMPLETED | OUTPATIENT
Start: 2025-06-21 | End: 2025-06-21

## 2025-06-21 RX ORDER — VALACYCLOVIR HYDROCHLORIDE 500 MG/1
500 TABLET, FILM COATED ORAL 3 TIMES DAILY
Qty: 21 TABLET | Refills: 0 | Status: SHIPPED | OUTPATIENT
Start: 2025-06-21 | End: 2025-06-28

## 2025-06-21 RX ADMIN — FLUORESCEIN SODIUM 1 EACH: 1 STRIP OPHTHALMIC at 09:06

## 2025-06-21 RX ADMIN — PROPARACAINE HYDROCHLORIDE 1 DROP: 5 SOLUTION/ DROPS OPHTHALMIC at 09:06

## 2025-06-21 NOTE — DISCHARGE INSTRUCTIONS
- per ophthalmology restart Valtrex they will see you in clinic next week for reassessment. Valtrex 500 mg TID for 7 days   -additionally your thyroid peroxidase levels came back elevated this is a lab that reflects autoimmune involvement with hypothyroidism.  Please follow up with your endocrinologist.    Any worsening symptoms you can return for further evaluation

## 2025-06-21 NOTE — ED TRIAGE NOTES
"Left eye  vision is intermittently blurry- onset a week ago . Having h/a and waves of dizziness.Both began at the same time. "May be related to my TSH level". Has floaters in left eye visual fields -  normally has them but  more now. Denies trauma  "

## 2025-06-21 NOTE — ED PROVIDER NOTES
Encounter Date: 6/21/2025       History     Chief Complaint   Patient presents with    Eye Problem     Intermittent blurred vision with headaches x1 week in left eye. Hx of color loss in same eye     Patient is a 44-year-old male medical history of hypothyroidism on Synthroid presents to the emergency department due to intermittent visual changes of the left eye accompanied by headaches over the last week.  He also describes intermittent floaters that appear worse when looking down or in certain positions.  Occasionally he will have central blind spot. He also reports one episode on his lower extremities that felt like bugs were crawling on him however this only lasted for 15 minutes .  Patient denies trauma to the eye.  No excessive tearing or discharge.  No pain with EOM movement, no monocular vision loss.         Review of patient's allergies indicates:   Allergen Reactions    Ciprofloxacin      Fatigued, GI upset     Past Medical History:   Diagnosis Date    Known health problems: none     Optic neuritis     Optic neuritis     Thyroid disease      Past Surgical History:   Procedure Laterality Date    ADENOIDECTOMY      CLAVICLE SURGERY Right 2014    has plate/hardware    HERNIA REPAIR Right 1992    inguinal     REFRACTIVE SURGERY Bilateral     PRK 2003 OU     TONSILLECTOMY       Family History   Problem Relation Name Age of Onset    Hypothyroidism Father      Hypothyroidism Sister      No Known Problems Mother      Heart attack Maternal Grandfather      Dementia Paternal Grandmother      Cancer Maternal Aunt          unknown type     Social History[1]  Review of Systems    Physical Exam     Initial Vitals [06/21/25 0856]   BP Pulse Resp Temp SpO2   (!) 143/82 81 18 98 °F (36.7 °C) 99 %      MAP       --         Physical Exam    Vitals reviewed.  Constitutional: He appears well-developed.   HENT:   Head: Normocephalic and atraumatic.   Eyes: Conjunctivae, EOM and lids are normal. Lids are everted and swept, no  foreign bodies found. No foreign body present in the right eye. No foreign body present in the left eye. Right conjunctiva is not injected. Left conjunctiva is not injected. Right eye exhibits normal extraocular motion. Left eye exhibits normal extraocular motion. Right pupil is reactive. Left pupil is reactive.   EOM intact, conjunctiva not injected.  No fluorescein uptake.  Peripheral fields intact.  Visual acuity intact.    See ophthalmology consult note for additional findings   Neck:   Normal range of motion.  Cardiovascular:  Normal rate.           Pulmonary/Chest: No respiratory distress.   Abdominal: Abdomen is soft. He exhibits no distension.   Musculoskeletal:         General: Normal range of motion.      Cervical back: Normal range of motion.     Neurological: He is alert and oriented to person, place, and time.   Skin: Skin is warm and dry.   Psychiatric: He has a normal mood and affect. Thought content normal.         ED Course   Procedures  Labs Reviewed   COMPREHENSIVE METABOLIC PANEL - Abnormal       Result Value    Sodium 141      Potassium 4.1      Chloride 107      CO2 23      Glucose 89      BUN 12      Creatinine 0.8      Calcium 9.4      Protein Total 7.8      Albumin 4.9      Bilirubin Total 1.6 (*)     ALP 59      AST 25      ALT 42      Anion Gap 11      eGFR >60     CBC WITH DIFFERENTIAL - Abnormal    WBC 7.92      RBC 5.68      HGB 17.8      HCT 52.7      MCV 93      MCH 31.3 (*)     MCHC 33.8      RDW 11.7      Platelet Count 210      MPV 9.1 (*)     Nucleated RBC 0      Neut % 59.0      Lymph % 27.1      Mono % 8.3      Eos % 2.4      Basophil % 1.6      Imm Grans % 1.6 (*)     Neut # 4.66      Lymph # 2.15      Mono # 0.66      Eos # 0.19      Baso # 0.13      Imm Grans # 0.13 (*)    THYROID PEROXIDASE ANTIBODY - Abnormal    Thyroid Peroxidase 100.2 (*)    TSH - Normal    TSH 0.788     CBC W/ AUTO DIFFERENTIAL    Narrative:     The following orders were created for panel order CBC auto  differential.  Procedure                               Abnormality         Status                     ---------                               -----------         ------                     CBC with Differential[[id number]]       Abnormal            Final result                 Please view results for these tests on the individual orders.   THYROID STIMULATING IMMUNOGLOBULIN          Imaging Results              MRI Head Orbits W/Wo Contrast (XPD) (Final result)  Result time 06/21/25 13:37:31      Final result by Justin Ann DO (06/21/25 13:37:31)                   Impression:      MR orbits: Unremarkable mr orbits as detailed above specifically without evidence of abnormal edema signal or enhancement along the optic pathway bilaterally.    MRI brain: Unremarkable limited MRI of the brain specifically without evidence for hydrocephalus, acute infarction or intracranial enhancing lesion.      Electronically signed by: Justin Ann DO  Date:    06/21/2025  Time:    13:37               Narrative:    EXAMINATION:  MRI HEAD-ORBITS W/WO CONTRAST (XPD)    CLINICAL HISTORY:  Optic neuritis suspected;    TECHNIQUE:  Sagittal T1, axial diffusion axial FLAIR, axial gradient, axial T1 precontrast imaging the whole brain precontrast.  Thin section coronal fat sat T2, axial T1 star vibe precontrast imaging of the orbits. Post contrast axial T1 fat sat star vibe, coronal T1 fat-sat imaging of the orbits and postcontrast axial T1 imaging the whole brain with axial T1 spoiled gradient imaging of the whole brain postcontrast which was re-formatted in the coronal and sagittal planes.  Ten mL of gadavist contrast was injected intravenously.    COMPARISON:  MRI brain 11/25/2019    FINDINGS:  MRI brain: Brain parenchyma is normal in signal and contour.  Ventricles normal in size without hydrocephalus.  There is no restricted diffusion to suggest acute or recent infarction.  No midline shift or significant mass effect.  No abnormal  parenchymal susceptibility to suggest parenchymal hemorrhage.  No abnormal parenchymal enhancement    MR orbits:    The globes are relatively symmetric.  The  extraocular muscles are within normal limits.  No evidence for intra/extra conal mass lesion bilaterally.  No abnormal edema signal or enhancement along the optic nerve pathway bilaterally                                       Medications   gadobutroL (GADAVIST) injection 10 mL (has no administration in time range)   fluorescein ophthalmic strip 1 each (1 each Both Eyes Given 6/21/25 0950)   proparacaine 0.5 % ophthalmic solution 1 drop (1 drop Both Eyes Given 6/21/25 0950)     Medical Decision Making  Patient  is a 44 y.o.  male  presenting to the emergency department with complaints of  intermittent vision changes of left eye     Differential diagnosis includes but  not limited to PVD, retinal detachment, optic neuritis, corneal abrasion     Consulted ophthalmology see consult note   Pt also requested TSH level, although clinically stable.   Discussed elevated peroxidase     Patient D/C home with follow up       Amount and/or Complexity of Data Reviewed  Labs: ordered.  Radiology: ordered.    Risk  Prescription drug management.                                      Clinical Impression:  Final diagnoses:  [H53.9] Vision changes (Primary)          ED Disposition Condition    Discharge Stable          ED Prescriptions       Medication Sig Dispense Start Date End Date Auth. Provider    valACYclovir (VALTREX) 500 MG tablet Take 1 tablet (500 mg total) by mouth 3 (three) times daily. for 7 days 21 tablet 6/21/2025 6/28/2025 Alanna Urban PA-C          Follow-up Information       Follow up With Specialties Details Why Contact Info Additional Information    [facility] - [address] [place] [address] Ophthalmology   [address]  [facility] [address] [phone] Please arrive on the 10th floor for check-in.                   [1]   Social History  Tobacco Use     Smoking status: Former     Current packs/day: 0.00     Types: Cigarettes     Start date: 2004     Quit date: 2016     Years since quittin.6    Smokeless tobacco: Never    Tobacco comments:     would smoke socially   Vaping Use    Vaping status: Never Used   Substance Use Topics    Alcohol use: Yes     Comment: social    Drug use: No        Alanna Urban, DILLON  25 1550

## 2025-06-21 NOTE — ED NOTES
LOC: The patient is awake and alert; oriented x 3 and speaking appropriately.  APPEARANCE: Patient resting comfortably, patient is clean and well groomed  SKIN: warm and dry, normal skin turgor & moist mucus membranes, skin intact, no breakdown noted.  MUSCULOSKELETAL: Patient moving all extremities well, no obvious swelling or deformities noted  RESPIRATORY: Airway is open and patent,  respirations are spontaneous, normal effort and rate  CARDIAC: Patient has a normal rate, no peripheral edema noted, capillary refill < 3 seconds; No complaints of chest pain   ABDOMEN: Soft and non tender to palpation, no distention noted.

## 2025-06-21 NOTE — CONSULTS
"Consultation Report  Ophthalmology Service    Date: 06/21/2025    Reason for Consult: "left eye vision changes"     History of Present Illness: Mundo Price is a 44 y.o. male with PMH of thyroid disease, possible optic neuritis? (7 yrs ago), genital HSV, who presents to [facility] for left eye intermittent blurred vision and left sided intermittent HA for 1 week. Reportedly, pt had symptoms of unilateral central band of blurred vision and "loss of color vision" in the left eye 7 years ago, says he had lab workup and imaging workup which was negative for optic neuritis, he was treated with steroids and says his symptoms improved over approx 3 days. Pt says for years he has noticed left eye blurred vision when he exerts himself when exercising. For the past wk, pt was traveling in [place], noticed unilateral temporal headache extending from occipital region to retro-orbital left eye. He describes the HA as intermittent and mild-moderate. He has also experienced unilateral left eye generalized blurred vision which has been daily for the past wk and lasts up to 4-5 hours. Says this is new and concerning for similar process that occurred 7 years ago, denies any color vision issues currently. He presented to [facility] ED today because he just returned from traveling yesterday and wanted to be evaluated. Reports he has chronic small floaters in the left eye which are stable. Denies any flashes of light, curtains veils, eye pain, darkening of vision, diplopia, or pain with EOM.     POcularHx: h/o lasik surgery O[facility], optic neuritis 7 yrs ago    Current eye gtts: Denies     Family Hx: Denies family history of glaucoma, macular degeneration, or blindness. family history includes Cancer in his maternal aunt; Dementia in his paternal grandmother; Heart attack in his maternal grandfather; Hypothyroidism in his father and sister; No Known Problems in his mother.     PMHx:  has a past medical history of Known health problems: none, Optic " neuritis, Optic neuritis, and Thyroid disease.     PSurgHx:  has a past surgical history that includes Hernia repair (Right, 1992); Tonsillectomy; Clavicle surgery (Right, 2014); Refractive surgery (Bilateral); and Adenoidectomy.     Home Medications:   Prior to Admission medications    Medication Sig Start Date End Date Taking? Authorizing Provider   azelastine (OPTIVAR) 0.05 % ophthalmic solution Place 1 drop into both eyes 2 (two) times daily. 10/28/24 6/21/25 Yes Sourav Long MD   fluticasone propionate (FLONASE) 50 mcg/actuation nasal spray 2 sprays (100 mcg total) by Each Nostril route once daily. 10/28/24  Yes Sourav Long MD   levothyroxine (SYNTHROID) 175 MCG tablet Take 1 tablet (175 mcg total) by mouth before breakfast. 5/28/25  Yes Mayte Baer NP   valACYclovir (VALTREX) 500 MG tablet Take 2 tablets (1,000 mg total) by mouth 2 (two) times daily. for 10 days 2/12/25 2/22/25  Fidel Sabillon MD        Medications this encounter:     Allergies: is allergic to ciprofloxacin.     Social:  reports that he quit smoking about 8 years ago. His smoking use included cigarettes. He started smoking about 20 years ago. He has never used smokeless tobacco. He reports current alcohol use. He reports that he does not use drugs.     ROS: As per HPI    Ocular examination/Dilated fundus examination:  Base Eye Exam       Visual Acuity (Snellen - Linear)         Right Left    Dist sc 20/20 20/20              Tonometry (Tonopen, 10:57 AM)         Right Left    Pressure 11 12              Pupils         Pupils Dark Light Shape React APD    Right PERRL 4 2 Round Brisk None    Left PERRL 4 2 Round Brisk None              Visual Fields         Right Left     Full Full              Extraocular Movement         Right Left     Full, Ortho Full, Ortho   NO PAIN WITH EOM             Neuro/Psych       Oriented x3: Yes    Mood/Affect: Normal              Dilation       Both eyes: 1% Mydriacyl, 2.5%  "Phenylephrine @ 10:57 AM                  Additional Tests       Color         Right Left    [place]sh[place]ara 12/12 12/12                  Slit Lamp and Fundus Exam       External Exam         Right Left    External Normal Normal              Slit Lamp Exam         Right Left    Lids/Lashes tr intermittent lagophthalmos <1 mm tr intermittent lagophthalmos <1 mm    Conjunctiva/Sclera White and quiet White and quiet    Cornea few inferior pees, clear centrally inferior confluent pees near limbus, some linear (not clearly dendritic, no terminal bulbs), clear centrally    Anterior Chamber Deep and quiet Deep and quiet    Iris Round and reactive Round and reactive    Lens tr NSC tr NSC    Anterior Vitreous Syneresis Syneresis, 2 small floaters in ant vitreous              Fundus Exam         Right Left    Disc Normal pink and sharp    C/D Ratio 0.1 0.1    Macula Flat and intact Flat and intact    Vessels Normal Normal    Periphery Normal Normal, inferior 2 small <1 DD areas of hyperpigmentation (noted previously)                      Assessment/Plan:     Intermittent blurred vision left eye   2.   H/o optic neuritis left eye?   - reportedly, pt had symptoms of unilateral central band of blurred vision and "loss of color vision" in the left eye 8 years ago, says he had lab workup and imaging workup which was negative for optic neuritis, he was treated with steroids and says his symptoms improved over approx 3 days   - pt says for years he has noticed left eye blurred vision when he exerts himself when exercising   - for past wk, unilateral left eye generalized blurred vision which has been daily and lasts up to 4-5 hours  - anterior exam and DFE unremarkable, color plates full OU no APD   - 2018, demyelinating panel negative  - see plan below    3.   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, took valtrex last 2/2025 "   - left eye white and quiet, trace conjunctival staining near limbus with inferior confluent pees near limbus, some linear (not clearly dendritic, no terminal bulbs), clear centrally  - lower suspicion for leticia HSV epithelial keratitis given appearance of pees and intermittent vision symptoms   - given pt has tolerated valtrex in the past and asymmetric presentation of linear appearing pees, will recommend starting 7 day course of PO valtrex 500 mg TID, to cover HSV as potential cause of pt's unilateral vision symptoms    Recommendations  - thyroid labs (T3, T4, TSH, TPO, TSI)  - MRI brain and orbits (thin slices, fat suppression) to evaluate for any optic nerve enhancement or edema   - can consider neurology consult/outpatient follow up for headache and vision changes which are concerning for uhthoff's phenomenom   - recommend 7 day course of PO valtrex 500 mg TID, to cover HSV as potential cause of pt's unilateral vision symptoms  - start preservative free artificial tears QID both eyes and lubricating ophthalmic ointment QHS both eyes PRN   - will message our schedulers to have patient follow up with neuro-ophthalmologist Dr. Goodwin at [facility]   - will message our schedulers to have pt follow up in general ophthalmology clinic in 1 week     Andrew Dias MD   [facility] PGY2  06/21/2025  10:57 AM        Common Ophthalmologic Abbreviations  OD: right eye  OS: left eye  OU: both eyes  IOP: intraocular pressure  VA: visual acuity  PH: pinhole  HM: hand motion  LP: light perception  NLP: no light perception  DFE: dilated fundus examination  SLE: slit lamp examination  RD: retinal detachment   AT: artificial tears  PFAT: preservative free artificial tears

## 2025-06-23 ENCOUNTER — TELEPHONE (OUTPATIENT)
Dept: OPHTHALMOLOGY | Facility: CLINIC | Age: 45
End: 2025-06-23
Payer: COMMERCIAL

## 2025-06-23 NOTE — TELEPHONE ENCOUNTER
----- Message from Andrew Dias sent at 6/21/2025 11:30 AM CDT -----  Regarding: Follow up from ED  Hello,    This patient was seen as an inpatient for intermittent vision changes in the left eye, with lesions on left cornea suspicious for HSV keratitis, can they please get follow up in optometry or general ophthalmology clinic in 7-10 days?     Best contact number [phone]     Please let me know if unable to schedule the patient.     Thank you!     Andrew Dias MD  [facility]hthalmology PGY2

## 2025-06-23 NOTE — TELEPHONE ENCOUNTER
----- Message from Andrew Dias sent at 6/21/2025 11:03 AM CDT -----  Regarding: Follow up from ED  Hello,    This patient was seen as an inpatient for intermittent vision changes in the left eye with h/o optic neuritis in the same eye. Can they please get follow up in neuro-oph clinic with Dr. Goodwin in 3-4 months?     Best contact number [phone]     Please let me know if unable to schedule the patient.     Thank you!     Andrew Dias MD  [facility] Ophthalmology PGY2

## 2025-06-23 NOTE — TELEPHONE ENCOUNTER
----- Message from Sveta Goodwin MD sent at 6/23/2025 10:52 AM CDT -----  Regarding: RE: Follow up from ED  Four months with testing  ----- Message -----  From: Helga Addison  Sent: 6/23/2025   8:33 AM CDT  To: Sveta Goodwin MD  Subject: FW: Follow up from ED                            Review.  ----- Message -----  From: Meghan Hairston MA  Sent: 6/23/2025   8:21 AM CDT  To: Helag Addison  Subject: FW: Follow up from ED                              ----- Message -----  From: Andrew Dias MD  Sent: 6/21/2025  11:04 AM CDT  To: [facility] Triage  Subject: Follow up from ED                                Hello,    This patient was seen as an inpatient for intermittent vision changes in the left eye with h/o optic neuritis in the same eye. Can they please get follow up in neuro-oph clinic with Dr. Goodwin in 3-4 months?     Best contact number [phone]     Please let me know if unable to schedule the patient.     Thank you!     Andrew Dias MD  [facility] PGY2

## 2025-06-25 LAB — W THYROID STIMULATING IG (TSI): <0.1 IU/L

## 2025-08-14 ENCOUNTER — OFFICE VISIT (OUTPATIENT)
Dept: ENDOCRINOLOGY | Facility: CLINIC | Age: 45
End: 2025-08-14
Payer: COMMERCIAL

## 2025-08-14 DIAGNOSIS — E03.9 ACQUIRED HYPOTHYROIDISM: Primary | ICD-10-CM

## 2025-08-14 PROCEDURE — 99212 OFFICE O/P EST SF 10 MIN: CPT | Performed by: NURSE PRACTITIONER

## 2025-08-14 PROCEDURE — 1160F RVW MEDS BY RX/DR IN RCRD: CPT | Mod: CPTII,95,, | Performed by: NURSE PRACTITIONER

## 2025-08-14 PROCEDURE — 1159F MED LIST DOCD IN RCRD: CPT | Mod: CPTII,95,, | Performed by: NURSE PRACTITIONER

## 2025-08-14 PROCEDURE — 98006 SYNCH AUDIO-VIDEO EST MOD 30: CPT | Mod: 95,,, | Performed by: NURSE PRACTITIONER

## 2025-08-14 PROCEDURE — G2211 COMPLEX E/M VISIT ADD ON: HCPCS | Mod: 95,,, | Performed by: NURSE PRACTITIONER

## 2025-08-14 RX ORDER — LEVOTHYROXINE SODIUM 175 UG/1
175 TABLET ORAL
Qty: 90 TABLET | Refills: 3 | Status: SHIPPED | OUTPATIENT
Start: 2025-08-14

## 2025-08-18 ENCOUNTER — OFFICE VISIT (OUTPATIENT)
Dept: OPTOMETRY | Facility: CLINIC | Age: 45
End: 2025-08-18
Payer: COMMERCIAL

## 2025-08-18 DIAGNOSIS — H52.7 REFRACTIVE ERROR: ICD-10-CM

## 2025-08-18 DIAGNOSIS — H43.392 FLOATER, VITREOUS, LEFT: ICD-10-CM

## 2025-08-18 DIAGNOSIS — Z01.00 ROUTINE EYE EXAM: Primary | ICD-10-CM

## 2025-08-18 PROCEDURE — 99999 PR PBB SHADOW E&M-EST. PATIENT-LVL II: CPT | Mod: PBBFAC,,, | Performed by: OPTOMETRIST

## 2025-08-18 PROCEDURE — 92014 COMPRE OPH EXAM EST PT 1/>: CPT | Mod: S$GLB,,, | Performed by: OPTOMETRIST

## 2025-08-18 PROCEDURE — 92015 DETERMINE REFRACTIVE STATE: CPT | Mod: S$GLB,,, | Performed by: OPTOMETRIST

## 2025-08-18 PROCEDURE — 1159F MED LIST DOCD IN RCRD: CPT | Mod: CPTII,S$GLB,, | Performed by: OPTOMETRIST

## 2025-08-18 PROCEDURE — 1160F RVW MEDS BY RX/DR IN RCRD: CPT | Mod: CPTII,S$GLB,, | Performed by: OPTOMETRIST
